# Patient Record
Sex: FEMALE | Race: WHITE | Employment: FULL TIME | URBAN - METROPOLITAN AREA
[De-identification: names, ages, dates, MRNs, and addresses within clinical notes are randomized per-mention and may not be internally consistent; named-entity substitution may affect disease eponyms.]

---

## 2017-02-23 ENCOUNTER — HOSPITAL ENCOUNTER (OUTPATIENT)
Dept: MAMMOGRAPHY | Age: 37
Discharge: HOME | End: 2017-02-23
Attending: OBSTETRICS & GYNECOLOGY
Payer: COMMERCIAL

## 2017-02-23 DIAGNOSIS — N63.0 BREAST LUMP: ICD-10-CM

## 2017-02-23 PROCEDURE — 77066 DX MAMMO INCL CAD BI: CPT

## 2017-02-23 PROCEDURE — 76642 ULTRASOUND BREAST LIMITED: CPT

## 2018-01-26 ENCOUNTER — OFFICE VISIT (OUTPATIENT)
Dept: OBGYN CLINIC | Age: 38
End: 2018-01-26

## 2018-01-26 VITALS
SYSTOLIC BLOOD PRESSURE: 120 MMHG | HEIGHT: 58 IN | WEIGHT: 130 LBS | BODY MASS INDEX: 27.29 KG/M2 | DIASTOLIC BLOOD PRESSURE: 74 MMHG

## 2018-01-26 DIAGNOSIS — Z01.419 WELL FEMALE EXAM WITH ROUTINE GYNECOLOGICAL EXAM: ICD-10-CM

## 2018-01-26 DIAGNOSIS — Z32.01 PREGNANCY EXAMINATION OR TEST, POSITIVE RESULT: Primary | ICD-10-CM

## 2018-01-26 LAB
ANTIBODY SCREEN, EXTERNAL: NEGATIVE
CHLAMYDIA, EXTERNAL: NEGATIVE
CYSTIC FIBROSIS, EXTERNAL: NORMAL
HBSAG, EXTERNAL: NEGATIVE
HCT, EXTERNAL: 36.4
HGB EVAL, EXTERNAL: NORMAL
HGB, EXTERNAL: 11.5
HIV, EXTERNAL: NEGATIVE
N. GONORRHEA, EXTERNAL: NEGATIVE
PLATELET CNT,   EXTERNAL: 348
RUBELLA, EXTERNAL: NORMAL
T. PALLIDUM, EXTERNAL: NEGATIVE
TYPE, ABO & RH, EXTERNAL: NORMAL
URINALYSIS, EXTERNAL: NEGATIVE

## 2018-01-26 RX ORDER — AMOXICILLIN 500 MG/1
500 CAPSULE ORAL
COMMUNITY
End: 2020-01-24 | Stop reason: ALTCHOICE

## 2018-01-26 NOTE — PATIENT INSTRUCTIONS
Learning About Pregnancy  Your Care Instructions    Your health in the early weeks of your pregnancy is particularly important for your baby's health. Take good care of yourself. Anything you do that harms your body can also harm your baby. Make sure to go to all of your doctor appointments. Regular checkups will help keep you and your baby healthy. How can you care for yourself at home? Diet  ? · Eat a balanced diet. Make sure your diet includes plenty of beans, peas, and leafy green vegetables. ? · Do not skip meals or go for many hours without eating. If you are nauseated, try to eat a small, healthy snack every 2 to 3 hours. ? · Do not eat fish that has a high level of mercury, such as shark, swordfish, or mackerel. Do not eat more than one can of tuna each week. ? · Drink plenty of fluids, enough so that your urine is light yellow or clear like water. If you have kidney, heart, or liver disease and have to limit fluids, talk with your doctor before you increase the amount of fluids you drink. ? · Cut down on caffeine, such as coffee, tea, and cola. ? · Do not drink alcohol, such as beer, wine, or hard liquor. ? · Take a multivitamin that contains at least 400 micrograms (mcg) of folic acid to help prevent birth defects. Fortified cereal and whole wheat bread are good additional sources of folic acid. ? · Increase the calcium in your diet. Try to drink a quart of skim milk each day. You may also take calcium supplements and choose foods such as cheese and yogurt. ? Lifestyle  ? · Make sure you go to your follow-up appointments. ? · Get plenty of rest. You may be unusually tired while you are pregnant. ? · Get at least 30 minutes of exercise on most days of the week. Walking is a good choice. If you have not exercised in the past, start out slowly. Take several short walks each day. ? · Do not smoke. If you need help quitting, talk to your doctor about stop-smoking programs.  These can increase your chances of quitting for good. ? · Do not touch cat feces or litter boxes. Also, wash your hands after you handle raw meat, and fully cook all meat before you eat it. Wear gloves when you work in the yard or garden, and wash your hands well when you are done. Cat feces, raw or undercooked meat, and contaminated dirt can cause an infection that may harm your baby or lead to a miscarriage. ? · Do not use saunas or hot tubs. Raising your body temperature may harm your baby. ? · Avoid chemical fumes, paint fumes, or poisons. ? · Do not use illegal drugs or alcohol. Medicines  ? · Review all of your medicines with your doctor. Some of your routine medicines may need to be changed to protect your baby. ? · Use acetaminophen (Tylenol) to relieve minor problems, such as a mild headache or backache or a mild fever with cold symptoms. Do not use nonsteroidal anti-inflammatory drugs (NSAIDs), such as ibuprofen (Advil, Motrin) or naproxen (Aleve), unless your doctor says it is okay. ? · Do not take two or more pain medicines at the same time unless the doctor told you to. Many pain medicines have acetaminophen, which is Tylenol. Too much acetaminophen (Tylenol) can be harmful. ? · Take your medicines exactly as prescribed. Call your doctor if you think you are having a problem with your medicine. ?To manage morning sickness  ? · If you feel sick when you first wake up, try eating a small snack (such as crackers) before you get out of bed. Allow some time to digest the snack, and then get out of bed slowly. ? · Do not skip meals or go for long periods without eating. An empty stomach can make nausea worse. ? · Eat small, frequent meals instead of three large meals each day. ? · Drink plenty of fluids. Sports drinks, such as Gatorade or Powerade, are good choices. ? · Eat foods that are high in protein but low in fat.    ? · If you are taking iron supplements, ask your doctor if they are necessary. Iron can make nausea worse. ? · Avoid any smells, such as coffee, that make you feel sick. ? · Get lots of rest. Morning sickness may be worse when you are tired. Follow-up care is a key part of your treatment and safety. Be sure to make and go to all appointments, and call your doctor if you are having problems. It's also a good idea to know your test results and keep a list of the medicines you take. Where can you learn more? Go to http://abby-delbert.info/. Enter O301 in the search box to learn more about \"Learning About Pregnancy. \"  Current as of: March 16, 2017  Content Version: 11.4  © 3672-8775 Healthwise, Incorporated. Care instructions adapted under license by [employer] (which disclaims liability or warranty for this information). If you have questions about a medical condition or this instruction, always ask your healthcare professional. [address] any warranty or liability for your use of this information.

## 2018-01-26 NOTE — MR AVS SNAPSHOT
[address] [facility] [address] [facility] 
[phone] Patient: Lilly Landis MRN: [id number] WME:[birth year] Visit Information Date & Time Provider Department Dept. Phone Encounter #  
 1/26/2018  1:00 PM Shahab Mason MD [facility] [phone] [id number] Upcoming Health Maintenance Date Due  
 PAP AKA CERVICAL CYTOLOGY 3/1/2015 Influenza Age 5 to Adult 8/1/2017 Allergies as of 1/26/2018  Review Complete On: 1/26/2018 By: Allyson Tucker No Known Allergies Current Immunizations  Never Reviewed No immunizations on file. Not reviewed this visit You Were Diagnosed With   
  
 Codes Comments Pregnancy examination or test, positive result    -  Primary ICD-10-CM: Z32.01 
ICD-9-CM: V72.42 Vitals BP Height(growth percentile) Weight(growth percentile) LMP BMI OB Status 120/74 4' 10\" (1.473 m) 130 lb (59 kg) (LMP Unknown) 27.17 kg/m2 Pregnant Smoking Status Never Smoker BMI and BSA Data Body Mass Index Body Surface Area  
 27.17 kg/m 2 1.55 m 2 Preferred Pharmacy Pharmacy Name Phone [facility] [address] [place] [address] [phone] Your Updated Medication List  
  
   
This list is accurate as of: 1/26/18  1:13 PM.  Always use your most recent med list.  
  
  
  
  
 amitriptyline 25 mg tablet Commonly known as:  ELAVIL Take 1 Tab by mouth nightly. amoxicillin 500 mg capsule Commonly known as:  AMOXIL Take 500 mg by mouth.  
  
 levothyroxine 75 mcg tablet Commonly known as:  SYNTHROID Take 1 tablet by mouth once daily before breakfast  
  
 SUMAtriptan 25 mg tablet Commonly known as:  IMITREX Take 1 Tab by mouth once as needed for Migraine for up to 1 dose. We Performed the Following ANTIBODY SCREEN C6364265 CPT(R)] BLOOD TYPE, (ABO+RH) [95424 CPT(R)] CBC W/O DIFF [69681 CPT(R)] CT/NG/T.VAGINALIS AMPLIFICATION [id number] CPT(R)] CULTURE, URINE [id number] CPT(R)] CYSTIC FIBROSIS MUTATION 97 [[id number] Custom] HEMOGLOBIN FRACTIONATION [[id number] Custom] HEP B SURFACE AG G7947193 CPT(R)] HIV 1/2 AG/AB, 4TH GENERATION,W RFLX CONFIRM C3141637 CPT(R)] PARVOVIRUS B19 AB, IGG [41369 CPT(R)] PARVOVIRUS B19 AB, IGM [74384 CPT(R)] PLATELET COUNT [66581 CPT(R)] RUBELLA AB, IGG A782154 CPT(R)] T PALLIDUM SCREEN W/REFLEX [[id number] Custom] Patient Instructions Learning About Pregnancy Your Care Instructions Your health in the early weeks of your pregnancy is particularly important for your baby's health. Take good care of yourself. Anything you do that harms your body can also harm your baby. Make sure to go to all of your doctor appointments. Regular checkups will help keep you and your baby healthy. How can you care for yourself at home? Diet ? · Eat a balanced diet. Make sure your diet includes plenty of beans, peas, and leafy green vegetables. ? · Do not skip meals or go for many hours without eating. If you are nauseated, try to eat a small, healthy snack every 2 to 3 hours. ? · Do not eat fish that has a high level of mercury, such as shark, swordfish, or mackerel. Do not eat more than one can of tuna each week. ? · Drink plenty of fluids, enough so that your urine is light yellow or clear like water. If you have kidney, heart, or liver disease and have to limit fluids, talk with your doctor before you increase the amount of fluids you drink. ? · Cut down on caffeine, such as coffee, tea, and cola. ? · Do not drink alcohol, such as beer, wine, or hard liquor. ? · Take a multivitamin that contains at least 400 micrograms (mcg) of folic acid to help prevent birth defects. Fortified cereal and whole wheat bread are good additional sources of folic acid. ? · Increase the calcium in your diet. Try to drink a quart of skim milk each day. You may also take calcium supplements and choose foods such as cheese and yogurt. ? Lifestyle ? · Make sure you go to your follow-up appointments. ? · Get plenty of rest. You may be unusually tired while you are pregnant. ? · Get at least 30 minutes of exercise on most days of the week. Walking is a good choice. If you have not exercised in the past, start out slowly. Take several short walks each day. ? · Do not smoke. If you need help quitting, talk to your doctor about stop-smoking programs. These can increase your chances of quitting for good. ? · Do not touch cat feces or litter boxes. Also, wash your hands after you handle raw meat, and fully cook all meat before you eat it. Wear gloves when you work in the yard or garden, and wash your hands well when you are done. Cat feces, raw or undercooked meat, and contaminated dirt can cause an infection that may harm your baby or lead to a miscarriage. ? · Do not use saunas or hot tubs. Raising your body temperature may harm your baby. ? · Avoid chemical fumes, paint fumes, or poisons. ? · Do not use illegal drugs or alcohol. Medicines ? · Review all of your medicines with your doctor. Some of your routine medicines may need to be changed to protect your baby. ? · Use acetaminophen (Tylenol) to relieve minor problems, such as a mild headache or backache or a mild fever with cold symptoms. Do not use nonsteroidal anti-inflammatory drugs (NSAIDs), such as ibuprofen (Advil, Motrin) or naproxen (Aleve), unless your doctor says it is okay. ? · Do not take two or more pain medicines at the same time unless the doctor told you to. Many pain medicines have acetaminophen, which is Tylenol. Too much acetaminophen (Tylenol) can be harmful. ? · Take your medicines exactly as prescribed. Call your doctor if you think you are having a problem with your medicine. ?To manage morning sickness ? · If you feel sick when you first wake up, try eating a small snack (such as crackers) before you get out of bed. Allow some time to digest the snack, and then get out of bed slowly. ? · Do not skip meals or go for long periods without eating. An empty stomach can make nausea worse. ? · Eat small, frequent meals instead of three large meals each day. ? · Drink plenty of fluids. Sports drinks, such as Gatorade or Powerade, are good choices. ? · Eat foods that are high in protein but low in fat. ? · If you are taking iron supplements, ask your doctor if they are necessary. Iron can make nausea worse. ? · Avoid any smells, such as coffee, that make you feel sick. ? · Get lots of rest. Morning sickness may be worse when you are tired. Follow-up care is a key part of your treatment and safety. Be sure to make and go to all appointments, and call your doctor if you are having problems. It's also a good idea to know your test results and keep a list of the medicines you take. Where can you learn more? Go to http://abby-delbert.info/. Enter P303 in the search box to learn more about \"Learning About Pregnancy. \" Current as of: March 16, 2017 Content Version: 11.4 © 1997-8232 Healthwise, Incorporated. Care instructions adapted under license by [employer] (which disclaims liability or warranty for this information). If you have questions about a medical condition or this instruction, always ask your healthcare professional. [place] [address] any warranty or liability for your use of this information. Introducing [facility]! [facility] introduces [employer] patient portal. Now you can access parts of your medical record, email your doctor's office, and request medication refills online. 1. In your internet browser, go to [email or web address] Had [email or web address] Had Cancer 2. Click on the First Time User? Click Here link in the Sign In box. You will see the New Member Sign Up page. 3. Enter your [employer] Access Code exactly as it appears below. You will not need to use this code after youve completed the sign-up process. If you do not sign up before the expiration date, you must request a new code. · [employer] Access Code: [email or web address] Expires: 4/26/2018  1:12 PM 
 
4. Enter the last four digits of your Social Security Number (xxxx) and Date of Birth (mm/dd/yyyy) as indicated and click Submit. You will be taken to the next sign-up page. 5. Create a [employer] ID. This will be your [employer] login ID and cannot be changed, so think of one that is secure and easy to remember. 6. Create a [employer] password. You can change your password at any time. 7. Enter your Password Reset Question and Answer. This can be used at a later time if you forget your password. 8. Enter your e-mail address. You will receive e-mail notification when new information is available in [address] 9. Click Sign Up. You can now view and download portions of your medical record. 10. Click the Download Summary menu link to download a portable copy of your medical information. If you have questions, please visit the Frequently Asked Questions section of the [employer] website. Remember, [employer] is NOT to be used for urgent needs. For medical emergencies, dial 911. Now available from your iPhone and Android! Please provide this summary of care documentation to your next provider. Your primary care clinician is listed as Sonia Eduardo. If you have any questions after today's visit, please call [phone].

## 2018-01-26 NOTE — PROGRESS NOTES
Current pregnancy history:    Ariana Humphreys is a ,  40 y.o. female [facility] No LMP recorded (lmp unknown). Patient is pregnant. .  She presents for the evaluation of amenorrhea and a positive pregnancy test.    LMP history:  The date of her LMP is not certain. Her last menstrual period was normal and lasted for 4 to 5 days. A urine pregnancy test was positive several weeks ago. She was not on the pill at conception. Ultrasound data:  She had an  ultrasound done by the ultrasound tech today which revealed a viable martinez pregnancy with a gestational age of 9 weeks and 0 days giving an EDC of 18. Pregnancy symptoms:    Since her LMP she has experienced  urinary frequency, breast tenderness, and nausea. She has not been vomiting over the last few weeks. Associated signs and symptoms which she denies: dysuria, discharge, vaginal bleeding. She currently is being treated for strep throat. Relevant past pregnancy history:   She has the following pregnancy history: Her last pregnancy was uncomplicated. She has no history of  delivery. Relevant past medical history:(relevant to this pregnancy): noncontributory. Pap/Occupational history:  Last pap smear: last year Results: Normal - pap today    Substance history: negative for alcohol, tobacco and street drugs. Positive for nothing. Exposure history: There is/are no indoor cat/s in the home. She admits close contact with children on a regular basis. She has had chicken pox or the vaccine in the past.   Patient denies issues with domestic violence. Genetic Screening/Teratology Counseling: (Includes patient, baby's father, or anyone in either family with:)  3.  Patient's age >/= 28 at [facility]?-- no  .   2.   [facility]semia ([facility] [place], [address], or  background): MCV<80?--no.     3.  Neural tube defect (meningomyelocele, spina bifida, anencephaly)?--no.   4.  Congenital heart defect?--no.  5.  Down syndrome?--no.   6.  Dwayen-Sachs (Yaz[place], [place])?--no.   7.  Canavan's Disease?--no.   8.  Familial Dysautonomia?--no.   9.  Sickle cell disease or trait ()? --no   The patient has not been tested for sickle trait  10. Hemophilia or other blood disorders?--no. 11.  Muscular dystrophy?--no. 12.  Cystic fibrosis?--no. 13.  Merced's Chorea?--no. 14.  Mental retardation/autism (if yes was person tested for Fragile X)?--no. 15.  Other inherited genetic or chromosomal disorder?--no. 12.  Maternal metabolic disorder (DM, PKU, etc)?--no. 17.  Patient or FOB with a child with a birth defect not listed above?--no.  17a. Patient or FOB with a birth defect themselves?--no. 18.  Recurrent pregnancy loss, or stillbirth?--no. 19.  Any medications since LMP other than prenatal vitamins (include vitamins, supplements, OTC meds, drugs, alcohol)?--no. 20.  Any other genetic/environmental exposure to discuss?--no. Infection History:  1. Lives with someone with TB or TB exposed?--no.   2.  Patient or partner has history of genital herpes?--no.  3.  Rash or viral illness since LMP?--no.    4.  History of STD (GC, CT, HPV, syphilis, HIV)? --no   5. Other: OTHER? Past Medical History:   Diagnosis Date    Graves disease     History of radiation treatment resulting in hypothyroidism    Hashimoto's thyroiditis     Thyroid disease      Past Surgical History:   Procedure Laterality Date    HX OTHER SURGICAL      radiation to thyroid gland, no longer has thyroid tissue present     Social History     Occupational History    Not on file.      Social History Main Topics    Smoking status: Never Smoker    Smokeless tobacco: Never Used    Alcohol use No    Drug use: No    Sexual activity: Yes     Partners: Male     Birth control/ protection: None     Family History   Problem Relation Age of Onset    Diabetes Mother     Hypertension Mother     Psoriasis Mother     Thyroid Disease Mother     Cancer Father      OB History    Para Term  AB Living   1        SAB TAB Ectopic Molar Multiple Live Births              # Outcome Date GA Lbr Brad/2nd Weight Sex Delivery Anes PTL Lv   1 Current                 No Known Allergies  Prior to Admission medications    Medication Sig Start Date End Date Taking? Authorizing Provider   amoxicillin (AMOXIL) 500 mg capsule Take 500 mg by mouth. Yes Historical Provider   levothyroxine (SYNTHROID) 75 mcg tablet Take 1 tablet by mouth once daily before breakfast 10/12/14  Yes Gary Eduardo MD   amitriptyline (ELAVIL) 25 mg tablet Take 1 Tab by mouth nightly. 3/1/16   Kendall Longo NP   SUMAtriptan (IMITREX) 25 mg tablet Take 1 Tab by mouth once as needed for Migraine for up to 1 dose.  3/1/16   Kendall Longo NP        Review of Systems: History obtained from the patient  Constitutional: negative for weight loss, fever, night sweats  HEENT: negative for hearing loss, earache, congestion, snoring, sore throat  CV: negative for chest pain, palpitations, edema  Resp: negative for cough, shortness of breath, wheezing  Breast: negative for breast lumps, nipple discharge, galactorrhea  GI: negative for change in bowel habits, abdominal pain, black or bloody stools  : negative for frequency, dysuria, hematuria, vaginal discharge  MSK: negative for back pain, joint pain, muscle pain  Skin: negative for itching, rash, hives  Neuro: negative for dizziness, headache, confusion, weakness  Psych: negative for anxiety, depression, change in mood  Heme/lymph: negative for bleeding, bruising, pallor    Objective:  Visit Vitals    /74    Ht 4' 10\" (1.473 m)    Wt 130 lb (59 kg)    LMP  (LMP Unknown)    BMI 27.17 kg/m2       Physical Exam:     Constitutional  · Appearance: well-nourished, well developed, alert, in no acute distress    HENT  · Head  · Face: appears normal  · Eyes: appear normal  · Ears: normal  · Mouth: normal  · Lips: no lesions    Neck  · Inspection/Palpation: normal appearance, no masses or tenderness  · Lymph Nodes: no lymphadenopathy present  · Thyroid: gland size normal, nontender, no nodules or masses present on palpation    Chest  · Respiratory Effort: breathing unlabored    Gastrointestinal  · Abdominal Examination: abdomen non-tender to palpation, normal bowel sounds, no masses present  · Liver and spleen: no hepatomegaly present, spleen not palpable  · Hernias: no hernias identified    Genitourinary  · External Genitalia: normal appearance for age, no discharge present, no tenderness present, no inflammatory lesions present, no masses present, no atrophy present  · Vagina: normal vaginal vault without central or paravaginal defects, no discharge present, no inflammatory lesions present, no masses present  · Bladder: non-tender to palpation  · Urethra: appears normal  · Cervix: normal   · Uterus: enlarged, normal shape, soft  · Adnexa: no adnexal tenderness present, no adnexal masses present  · Perineum: perineum within normal limits, no evidence of trauma, no rashes or skin lesions present  · Anus: anus within normal limits, no hemorrhoids present  · Inguinal Lymph Nodes: no lymphadenopathy present    Skin  · General Inspection: no rash, no lesions identified    Neurologic/Psychiatric  · Mental Status:  · Orientation: grossly oriented to person, place and time  · Mood and Affect: mood normal, affect appropriate    Assessment:   Intrauterine pregnancy with the following problems identified: [facility] referral.  Interested in [facility]. Will RTO next week for bloodwork. Plan:     Offered CF testing, CVS, Nuchal Translucency, MSAFP, amnio, and discussed NIPT  Course of pregnancy discussed including visit schedule, routine U/S, glucola testing, etc.  Avoid alcoholic beverages and illicit/recreational drugs use  Take prenatal vitamins or folic acid daily.   Hospital and practice style discussed with coverage system. Discussed nutrition, toxoplasmosis precautions, sexual activity, exercise, need for influenza vaccine, environmental and work hazards, travel advice, screen for domestic violence, need for seat belts. Discussed seafood, unpasteurized dairy products, deli meat, artificial sweeteners, and caffeine. Information on prenatal classes/breastfeeding given. Information on circumcision given  Patient encouraged not to smoke. Discussed current prescription drug use. Given medication list.  Discussed the use of over the counter medications and chemicals. Route of delivery discussed, including risks, benefits, and alternatives of  versus repeat LTCS. Pt understands risk of hemorrhage during pregnancy and post delivery and would accept blood products if necessary in life-threatening emergencies    Handouts given to pt.

## 2018-01-30 LAB
C TRACH RRNA SPEC QL NAA+PROBE: NEGATIVE
N GONORRHOEA RRNA SPEC QL NAA+PROBE: NEGATIVE
T VAGINALIS RRNA SPEC QL NAA+PROBE: NEGATIVE

## 2018-01-31 LAB — BACTERIA UR CULT: NO GROWTH

## 2018-02-01 LAB
CYTOLOGIST CVX/VAG CYTO: NORMAL
CYTOLOGY CVX/VAG DOC THIN PREP: NORMAL
CYTOLOGY HISTORY:: NORMAL
DX ICD CODE: NORMAL
HPV I/H RISK 1 DNA CVX QL PROBE+SIG AMP: NEGATIVE
Lab: NORMAL
OTHER STN SPEC: NORMAL
PATH REPORT.FINAL DX SPEC: NORMAL
STAT OF ADQ CVX/VAG CYTO-IMP: NORMAL

## 2018-02-06 LAB
ABO GROUP BLD: NORMAL
B19V IGG SER IA-ACNC: 0.2 INDEX (ref 0–0.8)
B19V IGM SER IA-ACNC: 0.3 INDEX (ref 0–0.8)
BLD GP AB SCN SERPL QL: NEGATIVE
CFTR MUT ANL BLD/T: NORMAL
ERYTHROCYTE [DISTWIDTH] IN BLOOD BY AUTOMATED COUNT: 15.8 % (ref 12.3–15.4)
GENE DIS ANL CARRIER INTERP-IMP: NORMAL
HBV SURFACE AG SERPL QL IA: NEGATIVE
HCT VFR BLD AUTO: 36.4 % (ref 34–46.6)
HGB A MFR BLD: 97.7 % (ref 96.4–98.8)
HGB A2 MFR BLD COLUMN CHROM: 2.3 % (ref 1.8–3.2)
HGB BLD-MCNC: 11.5 G/DL (ref 11.1–15.9)
HGB C MFR BLD: 0 %
HGB F MFR BLD: 0 % (ref 0–2)
HGB FRACT BLD-IMP: NORMAL
HGB OTHER MFR BLD HPLC: 0 %
HGB S BLD QL SOLY: NEGATIVE
HGB S MFR BLD: 0 %
HIV 1+2 AB+HIV1 P24 AG SERPL QL IA: NON REACTIVE
MCH RBC QN AUTO: 24.4 PG (ref 26.6–33)
MCHC RBC AUTO-ENTMCNC: 31.6 G/DL (ref 31.5–35.7)
MCV RBC AUTO: 77 FL (ref 79–97)
PLATELET # BLD AUTO: 348 X10E3/UL (ref 150–379)
RBC # BLD AUTO: 4.72 X10E6/UL (ref 3.77–5.28)
RH BLD: POSITIVE
RUBV IGG SERPL IA-ACNC: 2.62 INDEX
T PALLIDUM AB SER QL IA: NEGATIVE
WBC # BLD AUTO: 4.8 X10E3/UL (ref 3.4–10.8)

## 2018-02-14 ENCOUNTER — HOSPITAL ENCOUNTER (OUTPATIENT)
Dept: PERINATAL CARE | Age: 38
Discharge: HOME | End: 2018-02-14
Attending: OBSTETRICS & GYNECOLOGY
Payer: COMMERCIAL

## 2018-02-14 PROCEDURE — 76801 OB US < 14 WKS SINGLE FETUS: CPT | Performed by: OBSTETRICS & GYNECOLOGY

## 2018-02-26 ENCOUNTER — ROUTINE PRENATAL (OUTPATIENT)
Dept: OBGYN CLINIC | Age: 38
End: 2018-02-26

## 2018-02-26 VITALS — DIASTOLIC BLOOD PRESSURE: 74 MMHG | SYSTOLIC BLOOD PRESSURE: 116 MMHG | WEIGHT: 130 LBS | BODY MASS INDEX: 27.17 KG/M2

## 2018-02-26 DIAGNOSIS — O09.522 ELDERLY MULTIGRAVIDA IN SECOND TRIMESTER: ICD-10-CM

## 2018-02-26 NOTE — MR AVS SNAPSHOT
900 [facility] [address] [place] [address] 
[phone] Patient: Ameena Guzman MRN: [id number] ANGEL:[birth year] Visit Information Date & Time Provider Department Dept. Phone Encounter #  
 2/26/2018  1:30 PM Francia Cardona MD M[facility] Teague [phone] [id number] Upcoming Health Maintenance Date Due Influenza Age 5 to Adult 8/1/2017 PAP AKA CERVICAL CYTOLOGY 1/26/2021 Allergies as of 2/26/2018  Review Complete On: 2/26/2018 By: Leena Frausto No Known Allergies Current Immunizations  Never Reviewed No immunizations on file. Not reviewed this visit Vitals BP Weight(growth percentile) LMP BMI OB Status Smoking Status 116/74 130 lb (59 kg) (LMP Unknown) 27.17 kg/m2 Pregnant Never Smoker BMI and BSA Data Body Mass Index Body Surface Area  
 27.17 kg/m 2 1.55 m 2 Preferred Pharmacy Pharmacy Name Phone [facility] [address] [place] [address] Lorna Donahue [phone] Your Updated Medication List  
  
   
This list is accurate as of 2/26/18  1:54 PM.  Always use your most recent med list.  
  
  
  
  
 amitriptyline 25 mg tablet Commonly known as:  ELAVIL Take 1 Tab by mouth nightly. amoxicillin 500 mg capsule Commonly known as:  AMOXIL Take 500 mg by mouth.  
  
 levothyroxine 75 mcg tablet Commonly known as:  SYNTHROID Take 1 tablet by mouth once daily before breakfast  
  
 SUMAtriptan 25 mg tablet Commonly known as:  IMITREX Take 1 Tab by mouth once as needed for Migraine for up to 1 dose. Introducing [facility] & HEALTH SERVICES! Dear Cruz Pierce: Thank you for requesting a [employer] account. Our records indicate that you already have an active O[employer] account. You can access your account anytime at [email or web address] Did you know that you can access your hospital and ER discharge instructions at any time in [employer]? You can also review all of your test results from your hospital stay or ER visit. Additional Information If you have questions, please visit the Frequently Asked Questions section of the [employer] website at [email or web address] Remember, [employer] is NOT to be used for urgent needs. For medical emergencies, dial 911. Now available from your iPhone and Android! Please provide this summary of care documentation to your next provider. Your primary care clinician is listed as Sonia Eduardo. If you have any questions after today's visit, please call [phone].

## 2018-03-26 ENCOUNTER — ROUTINE PRENATAL (OUTPATIENT)
Dept: OBGYN CLINIC | Age: 38
End: 2018-03-26

## 2018-03-26 VITALS — DIASTOLIC BLOOD PRESSURE: 60 MMHG | WEIGHT: 134 LBS | BODY MASS INDEX: 28.01 KG/M2 | SYSTOLIC BLOOD PRESSURE: 112 MMHG

## 2018-03-26 DIAGNOSIS — Z34.82 PRENATAL CARE, SUBSEQUENT PREGNANCY IN SECOND TRIMESTER: Primary | ICD-10-CM

## 2018-03-26 LAB — AFP, MATERNAL, EXTERNAL: NORMAL

## 2018-03-26 NOTE — MR AVS SNAPSHOT
900 [facility] [address] [facility] 
[phone] Patient: Kristy Sharpe MRN: [id number] RJQ:[birth year] Visit Information Date & Time Provider Department Dept. Phone Encounter #  
 3/26/2018  2:00 PM Argelia SaucedaPhilly [phone] [id number] Upcoming Health Maintenance Date Due Influenza Age 5 to Adult 8/1/2017 PAP AKA CERVICAL CYTOLOGY 1/26/2021 Allergies as of 3/26/2018  Review Complete On: 3/26/2018 By: Argelia Sauceda MD  
 No Known Allergies Current Immunizations  Never Reviewed No immunizations on file. Not reviewed this visit You Were Diagnosed With   
  
 Codes Comments Prenatal care, subsequent pregnancy in second trimester    -  Primary ICD-10-CM: Z34.82 
ICD-9-CM: V22.1 Vitals BP Weight(growth percentile) LMP BMI OB Status Smoking Status 112/60 134 lb (60.8 kg) (LMP Unknown) 28.01 kg/m2 Pregnant Never Smoker BMI and BSA Data Body Mass Index Body Surface Area 28.01 kg/m 2 1.58 m 2 Preferred Pharmacy Pharmacy Name Phone [facility] [address] [place] [address] [phone] Your Updated Medication List  
  
   
This list is accurate as of 3/26/18  2:01 PM.  Always use your most recent med list.  
  
  
  
  
 amitriptyline 25 mg tablet Commonly known as:  ELAVIL Take 1 Tab by mouth nightly. amoxicillin 500 mg capsule Commonly known as:  AMOXIL Take 500 mg by mouth.  
  
 levothyroxine 75 mcg tablet Commonly known as:  SYNTHROID Take 1 tablet by mouth once daily before breakfast  
  
 SUMAtriptan 25 mg tablet Commonly known as:  IMITREX Take 1 Tab by mouth once as needed for Migraine for up to 1 dose. We Performed the Following   
 AFP, MATERNAL SCREEN [23058 CPT(R)] Introducing [facility] & HEALTH SERVICES! Dear Serena Severe: Thank you for requesting a [employer] account. Our records indicate that you already have an active [employer] account. You can access your account anytime at [email or web address] Did you know that you can access your hospital and ER discharge instructions at any time in [employer]? You can also review all of your test results from your hospital stay or ER visit. Additional Information If you have questions, please visit the Frequently Asked Questions section of the [employer] website at [email or web address] Remember, [employer] is NOT to be used for urgent needs. For medical emergencies, dial 911. Now available from your iPhone and Android! Please provide this summary of care documentation to your next provider. Your primary care clinician is listed as Sonia Eduardo. If you have any questions after today's visit, please call [phone].

## 2018-03-28 LAB
AFP ADJ MOM SERPL: 2.08
AFP INTERP SERPL-IMP: NORMAL
AFP INTERP SERPL-IMP: NORMAL
AFP SERPL-MCNC: 95.9 NG/ML
AGE AT DELIVERY: 38.4 YEARS
COMMENT, 018013: NORMAL
GA METHOD: NORMAL
GA: 18 WEEKS
IDDM PATIENT QL: NO
MULTIPLE PREGNANCY: NO
NEURAL TUBE DEFECT RISK FETUS: 676 %
RESULTS, 017004: NORMAL

## 2018-04-09 ENCOUNTER — HOSPITAL ENCOUNTER (OUTPATIENT)
Dept: PERINATAL CARE | Age: 38
Discharge: HOME | End: 2018-04-09
Attending: OBSTETRICS & GYNECOLOGY
Payer: COMMERCIAL

## 2018-04-09 PROCEDURE — 76811 OB US DETAILED SNGL FETUS: CPT | Performed by: OBSTETRICS & GYNECOLOGY

## 2018-04-23 ENCOUNTER — ROUTINE PRENATAL (OUTPATIENT)
Dept: OBGYN CLINIC | Age: 38
End: 2018-04-23

## 2018-04-23 VITALS — BODY MASS INDEX: 28.63 KG/M2 | DIASTOLIC BLOOD PRESSURE: 78 MMHG | SYSTOLIC BLOOD PRESSURE: 118 MMHG | WEIGHT: 137 LBS

## 2018-04-23 DIAGNOSIS — O09.522 ELDERLY MULTIGRAVIDA IN SECOND TRIMESTER: Primary | ICD-10-CM

## 2018-04-23 NOTE — MR AVS SNAPSHOT
[address] [place] Carlotta Cortes  [address] 
[phone] Patient: Neel Zimmerman MRN: [id number] EYS:[birth year] Visit Information Date & Time Provider Department Dept. Phone Encounter #  
 4/23/2018  2:20 PM Kenney Monteiro MD [facility] [phone] [id number] Upcoming Health Maintenance Date Due Influenza Age 5 to Adult 8/1/2017 PAP AKA CERVICAL CYTOLOGY 1/26/2021 Allergies as of 4/23/2018  Review Complete On: 4/23/2018 By: Юлия Munguia No Known Allergies Current Immunizations  Never Reviewed No immunizations on file. Not reviewed this visit Vitals BP Weight(growth percentile) LMP BMI OB Status Smoking Status 118/78 137 lb (62.1 kg) (LMP Unknown) 28.63 kg/m2 Pregnant Never Smoker BMI and BSA Data Body Mass Index Body Surface Area  
 28.63 kg/m 2 1.59 m 2 Preferred Pharmacy Pharmacy Name Phone [facility] [address] [place] [address] [phone] Your Updated Medication List  
  
   
This list is accurate as of 4/23/18  2:29 PM.  Always use your most recent med list.  
  
  
  
  
 amitriptyline 25 mg tablet Commonly known as:  ELAVIL Take 1 Tab by mouth nightly. amoxicillin 500 mg capsule Commonly known as:  AMOXIL Take 500 mg by mouth.  
  
 levothyroxine 75 mcg tablet Commonly known as:  SYNTHROID Take 1 tablet by mouth once daily before breakfast  
  
 SUMAtriptan 25 mg tablet Commonly known as:  IMITREX Take 1 Tab by mouth once as needed for Migraine for up to 1 dose. Introducing [facility] & HEALTH SERVICES! Dear Preston Wilder: Thank you for requesting a [employer] account. Our records indicate that you already have an active [employer] account. You can access your account anytime at [email or web address] Did you know that you can access your hospital and ER discharge instructions at any time in [employer]? You can also review all of your test results from your hospital stay or ER visit. Additional Information If you have questions, please visit the Frequently Asked Questions section of the [employer] website at [email or web address] Remember, [employer] is NOT to be used for urgent needs. For medical emergencies, dial 911. Now available from your iPhone and Android! Please provide this summary of care documentation to your next provider. Your primary care clinician is listed as Sonia Eduardo. If you have any questions after today's visit, please call [phone].

## 2018-05-23 ENCOUNTER — ROUTINE PRENATAL (OUTPATIENT)
Dept: OBGYN CLINIC | Age: 38
End: 2018-05-23

## 2018-05-23 VITALS — WEIGHT: 135 LBS | BODY MASS INDEX: 28.22 KG/M2 | DIASTOLIC BLOOD PRESSURE: 70 MMHG | SYSTOLIC BLOOD PRESSURE: 102 MMHG

## 2018-05-23 DIAGNOSIS — Z34.82 PRENATAL CARE, SUBSEQUENT PREGNANCY IN SECOND TRIMESTER: Primary | ICD-10-CM

## 2018-05-23 LAB — GTT, 1 HR, GLUCOLA, EXTERNAL: NORMAL

## 2018-05-23 NOTE — LETTER
5/23/2018 10:20 AM 
 
Ms. Eddie Arias 
[address] [address] [phone] Due to discomforts of pregnancy she may suffer from restless leg syndrome and may need to use the bathroom every 2 hours. Sincerely, Eliud Longoria MD

## 2018-05-24 LAB
ERYTHROCYTE [DISTWIDTH] IN BLOOD BY AUTOMATED COUNT: 14.7 % (ref 12.3–15.4)
GLUCOSE 1H P 50 G GLC PO SERPL-MCNC: 146 MG/DL (ref 65–129)
HCT VFR BLD AUTO: 34.9 % (ref 34–46.6)
HGB BLD-MCNC: 10.7 G/DL (ref 11.1–15.9)
MCH RBC QN AUTO: 24.8 PG (ref 26.6–33)
MCHC RBC AUTO-ENTMCNC: 30.7 G/DL (ref 31.5–35.7)
MCV RBC AUTO: 81 FL (ref 79–97)
PLATELET # BLD AUTO: 337 X10E3/UL (ref 150–379)
RBC # BLD AUTO: 4.31 X10E6/UL (ref 3.77–5.28)
WBC # BLD AUTO: 8.2 X10E3/UL (ref 3.4–10.8)

## 2018-05-24 NOTE — PROGRESS NOTES
Patient aware of results and MD recommendations by phone. Patient aware nothing to eat after midnight, but all of the plain water to drink. She is aware that the gtt testing starts at 8:30.   Patient scheduled 3hr gtt for 5/31/2018

## 2018-05-31 ENCOUNTER — LAB ONLY (OUTPATIENT)
Dept: OBGYN CLINIC | Age: 38
End: 2018-05-31

## 2018-05-31 DIAGNOSIS — Z34.80 PRENATAL CARE OF MULTIGRAVIDA, ANTEPARTUM: Primary | ICD-10-CM

## 2018-05-31 DIAGNOSIS — R89.9 ABNORMAL LABORATORY TEST RESULT: ICD-10-CM

## 2018-05-31 LAB
GTT 120 MIN, EXTERNAL: 167
GTT 180 MIN, EXTERNAL: 115
GTT 60 MIN, EXTERNAL: 188
GTT, FASTING, EXTERNAL: 77

## 2018-06-01 LAB
GLUCOSE 1H P 100 G GLC PO SERPL-MCNC: 188 MG/DL (ref 65–179)
GLUCOSE 2H P 100 G GLC PO SERPL-MCNC: 167 MG/DL (ref 65–154)
GLUCOSE 3H P 100 G GLC PO SERPL-MCNC: 115 MG/DL (ref 65–139)
GLUCOSE P FAST SERPL-MCNC: 77 MG/DL (ref 65–94)
NOTE:, 102047: ABNORMAL

## 2018-06-04 DIAGNOSIS — O09.522 ELDERLY MULTIGRAVIDA IN SECOND TRIMESTER: ICD-10-CM

## 2018-06-04 NOTE — PROGRESS NOTES
Pt notified-- has an appt with Dr. Jason Juares on 6/25/18-- faxed over [facility] and [address] [place] [address] appt request

## 2018-06-06 ENCOUNTER — HOSPITAL ENCOUNTER (OUTPATIENT)
Dept: DIABETES SERVICES | Age: 38
Discharge: HOME | End: 2018-06-06
Payer: COMMERCIAL

## 2018-06-06 ENCOUNTER — ROUTINE PRENATAL (OUTPATIENT)
Dept: OBGYN CLINIC | Age: 38
End: 2018-06-06

## 2018-06-06 VITALS
HEIGHT: 59 IN | DIASTOLIC BLOOD PRESSURE: 64 MMHG | BODY MASS INDEX: 27.62 KG/M2 | WEIGHT: 137 LBS | SYSTOLIC BLOOD PRESSURE: 100 MMHG

## 2018-06-06 DIAGNOSIS — O24.419 GESTATIONAL DIABETES MELLITUS (GDM), ANTEPARTUM, GESTATIONAL DIABETES METHOD OF CONTROL UNSPECIFIED: ICD-10-CM

## 2018-06-06 DIAGNOSIS — O09.522 ELDERLY MULTIGRAVIDA IN SECOND TRIMESTER: ICD-10-CM

## 2018-06-06 DIAGNOSIS — Z23 ENCOUNTER FOR IMMUNIZATION: Primary | ICD-10-CM

## 2018-06-06 PROCEDURE — G0108 DIAB MANAGE TRN  PER INDIV: HCPCS | Performed by: DIETITIAN, REGISTERED

## 2018-06-06 RX ORDER — BLOOD-GLUCOSE CONTROL, NORMAL
EACH MISCELLANEOUS
Qty: 120 LANCET | Refills: 11 | Status: SHIPPED | OUTPATIENT
Start: 2018-06-06 | End: 2020-01-24 | Stop reason: ALTCHOICE

## 2018-06-06 RX ORDER — LEVOTHYROXINE SODIUM 88 UG/1
TABLET ORAL
COMMUNITY
Start: 2018-05-30

## 2018-06-06 NOTE — PROGRESS NOTES
45year old  28w5d pregnant patient given the 0.5ml t-dap injection as per MD order. Patient signed consent. Patient tolerated the injection in left deltoid with out complications.

## 2018-06-06 NOTE — PROGRESS NOTES
Pt doing well, see prenatal flowsheet. tdap today  Saw [facility] today and [facility] and Endocrine appointments in 2 weeks.

## 2018-06-07 ENCOUNTER — TELEPHONE (OUTPATIENT)
Dept: OBGYN CLINIC | Age: 38
End: 2018-06-07

## 2018-06-07 NOTE — TELEPHONE ENCOUNTER
[facility] calling to get information on how often she will need to test her blood sugar for her glucose strips for insurance purposes. Up to 4 x daily.     Pharmacy advised

## 2018-06-15 ENCOUNTER — HOSPITAL ENCOUNTER (OUTPATIENT)
Dept: DIABETES SERVICES | Age: 38
Discharge: HOME | End: 2018-06-15
Payer: COMMERCIAL

## 2018-06-15 DIAGNOSIS — O24.419 GESTATIONAL DIABETES MELLITUS (GDM), ANTEPARTUM, GESTATIONAL DIABETES METHOD OF CONTROL UNSPECIFIED: ICD-10-CM

## 2018-06-15 PROCEDURE — G0108 DIAB MANAGE TRN  PER INDIV: HCPCS | Performed by: DIETITIAN, REGISTERED

## 2018-06-15 NOTE — DIABETES MGMT
06/15/18    Thank you for your kind referral. Your patient, Marcelo De La Cruz attended a follow up gestational diabetes education session at [address] [place] [address] where the following topics were covered today:    *Incorporating nutrition management into lifestyle   *Monitoring blood glucose and other parameters and interpreting and using the results  for self-management decision making   * Incorporating physical activity into lifestyle   * Using medication(s) safely and for maximum therapeutic benefit   * Develop personal strategies to promote health and behavior change  * States pros and cons of breastfeeding  * Develop personal strategies to prevent Type 2 Diabetes in the future      Data from this visit:   Weight: 6/6/2018 136.4 #, 6/15/2018 138.2 #    Pt continued her goals set at first session: Goal 1: check my blood sugar 4 times per day  Goal 2: eat meals and snacks about the same time each day and discontinue all sweet beverages including fruit juice    Comments: Pt has had issues getting test strips so not much data to review today. All but one BS were WNL. Your patient has been encouraged to follow up with the [place] [address] staff by phone or in the office 6-12 weeks post partum so we can assess weight loss, meal planning skills and activity levels post partum to help prevent Type 2 Diabetes in the future. We look forward to assisting your patient in meeting their self-management goals.  If you have any questions, please do not hesitate to call the [facility] at [phone]    Sincerely, Tess Friedman, [address]  Phone: [phone] Fax: [phone]

## 2018-06-20 ENCOUNTER — ROUTINE PRENATAL (OUTPATIENT)
Dept: OBGYN CLINIC | Age: 38
End: 2018-06-20

## 2018-06-20 ENCOUNTER — HOSPITAL ENCOUNTER (OUTPATIENT)
Dept: PERINATAL CARE | Age: 38
Discharge: HOME | End: 2018-06-20
Attending: OBSTETRICS & GYNECOLOGY
Payer: COMMERCIAL

## 2018-06-20 VITALS — BODY MASS INDEX: 28.56 KG/M2 | DIASTOLIC BLOOD PRESSURE: 76 MMHG | SYSTOLIC BLOOD PRESSURE: 110 MMHG | WEIGHT: 139 LBS

## 2018-06-20 DIAGNOSIS — O09.522 ELDERLY MULTIGRAVIDA IN SECOND TRIMESTER: Primary | ICD-10-CM

## 2018-06-20 PROCEDURE — 76815 OB US LIMITED FETUS(S): CPT | Performed by: OBSTETRICS & GYNECOLOGY

## 2018-06-20 PROCEDURE — 76820 UMBILICAL ARTERY ECHO: CPT | Performed by: OBSTETRICS & GYNECOLOGY

## 2018-07-05 ENCOUNTER — HOSPITAL ENCOUNTER (OUTPATIENT)
Age: 38
Setting detail: OBSERVATION
Discharge: HOME | End: 2018-07-06
Attending: OBSTETRICS & GYNECOLOGY | Admitting: OBSTETRICS & GYNECOLOGY
Payer: COMMERCIAL

## 2018-07-05 ENCOUNTER — ROUTINE PRENATAL (OUTPATIENT)
Dept: OBGYN CLINIC | Age: 38
End: 2018-07-05

## 2018-07-05 VITALS — WEIGHT: 137 LBS | SYSTOLIC BLOOD PRESSURE: 118 MMHG | DIASTOLIC BLOOD PRESSURE: 80 MMHG | BODY MASS INDEX: 28.15 KG/M2

## 2018-07-05 DIAGNOSIS — O09.523 ELDERLY MULTIGRAVIDA IN THIRD TRIMESTER: Primary | ICD-10-CM

## 2018-07-05 PROBLEM — Z34.90 PREGNANCY: Status: ACTIVE | Noted: 2018-07-05

## 2018-07-05 LAB
A1 MICROGLOB PLACENTAL VAG QL: NORMAL
ALBUMIN SERPL-MCNC: 2.5 G/DL (ref 3.5–5)
ALBUMIN/GLOB SERPL: 0.6 {RATIO} (ref 1.1–2.2)
ALP SERPL-CCNC: 319 U/L (ref 45–117)
ALT SERPL-CCNC: 15 U/L (ref 12–78)
ANION GAP SERPL CALC-SCNC: 8 MMOL/L (ref 5–15)
AST SERPL-CCNC: 24 U/L (ref 15–37)
BASOPHILS # BLD: 0 K/UL (ref 0–0.1)
BASOPHILS NFR BLD: 0 % (ref 0–1)
BILIRUB SERPL-MCNC: 0.4 MG/DL (ref 0.2–1)
BUN SERPL-MCNC: 12 MG/DL (ref 6–20)
BUN/CREAT SERPL: 17 (ref 12–20)
CALCIUM SERPL-MCNC: 8.8 MG/DL (ref 8.5–10.1)
CHLORIDE SERPL-SCNC: 104 MMOL/L (ref 97–108)
CO2 SERPL-SCNC: 23 MMOL/L (ref 21–32)
CONTROL LINE PRESENT?: NORMAL
CREAT SERPL-MCNC: 0.71 MG/DL (ref 0.55–1.02)
DIFFERENTIAL METHOD BLD: ABNORMAL
EOSINOPHIL # BLD: 0.2 K/UL (ref 0–0.4)
EOSINOPHIL NFR BLD: 2 % (ref 0–7)
ERYTHROCYTE [DISTWIDTH] IN BLOOD BY AUTOMATED COUNT: 17.1 % (ref 11.5–14.5)
EXPIRATION DATE: NORMAL
GLOBULIN SER CALC-MCNC: 4.3 G/DL (ref 2–4)
GLUCOSE BLD STRIP.AUTO-MCNC: 115 MG/DL (ref 65–100)
GLUCOSE BLD STRIP.AUTO-MCNC: 206 MG/DL (ref 65–100)
GLUCOSE SERPL-MCNC: 72 MG/DL (ref 65–100)
HCT VFR BLD AUTO: 36.1 % (ref 35–47)
HGB BLD-MCNC: 10.8 G/DL (ref 11.5–16)
IMM GRANULOCYTES # BLD: 0 K/UL (ref 0–0.04)
IMM GRANULOCYTES NFR BLD AUTO: 0 % (ref 0–0.5)
INTERNAL NEGATIVE CONTROL: NORMAL
KIT LOT NO.: NORMAL
LYMPHOCYTES # BLD: 1 K/UL (ref 0.8–3.5)
LYMPHOCYTES NFR BLD: 14 % (ref 12–49)
MCH RBC QN AUTO: 23.6 PG (ref 26–34)
MCHC RBC AUTO-ENTMCNC: 29.9 G/DL (ref 30–36.5)
MCV RBC AUTO: 79 FL (ref 80–99)
MONOCYTES # BLD: 0.5 K/UL (ref 0–1)
MONOCYTES NFR BLD: 7 % (ref 5–13)
NEUTS SEG # BLD: 5.7 K/UL (ref 1.8–8)
NEUTS SEG NFR BLD: 77 % (ref 32–75)
NRBC # BLD: 0 K/UL (ref 0–0.01)
NRBC BLD-RTO: 0 PER 100 WBC
PLATELET # BLD AUTO: 283 K/UL (ref 150–400)
PMV BLD AUTO: 10.5 FL (ref 8.9–12.9)
POTASSIUM SERPL-SCNC: 4 MMOL/L (ref 3.5–5.1)
PROT SERPL-MCNC: 6.8 G/DL (ref 6.4–8.2)
RBC # BLD AUTO: 4.57 M/UL (ref 3.8–5.2)
SERVICE CMNT-IMP: ABNORMAL
SERVICE CMNT-IMP: ABNORMAL
SODIUM SERPL-SCNC: 135 MMOL/L (ref 136–145)
WBC # BLD AUTO: 7.4 K/UL (ref 3.6–11)

## 2018-07-05 PROCEDURE — 84112 EVAL AMNIOTIC FLUID PROTEIN: CPT | Performed by: OBSTETRICS & GYNECOLOGY

## 2018-07-05 PROCEDURE — 74011000258 HC RX REV CODE- 258: Performed by: OBSTETRICS & GYNECOLOGY

## 2018-07-05 PROCEDURE — 74011250637 HC RX REV CODE- 250/637: Performed by: OBSTETRICS & GYNECOLOGY

## 2018-07-05 PROCEDURE — 96365 THER/PROPH/DIAG IV INF INIT: CPT

## 2018-07-05 PROCEDURE — 36415 COLL VENOUS BLD VENIPUNCTURE: CPT | Performed by: OBSTETRICS & GYNECOLOGY

## 2018-07-05 PROCEDURE — 96366 THER/PROPH/DIAG IV INF ADDON: CPT

## 2018-07-05 PROCEDURE — 96372 THER/PROPH/DIAG INJ SC/IM: CPT

## 2018-07-05 PROCEDURE — 80053 COMPREHEN METABOLIC PANEL: CPT | Performed by: OBSTETRICS & GYNECOLOGY

## 2018-07-05 PROCEDURE — 85025 COMPLETE CBC W/AUTO DIFF WBC: CPT | Performed by: OBSTETRICS & GYNECOLOGY

## 2018-07-05 PROCEDURE — 99218 HC RM OBSERVATION: CPT

## 2018-07-05 PROCEDURE — 74011250636 HC RX REV CODE- 250/636: Performed by: OBSTETRICS & GYNECOLOGY

## 2018-07-05 PROCEDURE — 82962 GLUCOSE BLOOD TEST: CPT

## 2018-07-05 RX ORDER — BETAMETHASONE SODIUM PHOSPHATE AND BETAMETHASONE ACETATE 3; 3 MG/ML; MG/ML
12 INJECTION, SUSPENSION INTRA-ARTICULAR; INTRALESIONAL; INTRAMUSCULAR; SOFT TISSUE EVERY 24 HOURS
Status: COMPLETED | OUTPATIENT
Start: 2018-07-05 | End: 2018-07-06

## 2018-07-05 RX ORDER — ZOLPIDEM TARTRATE 5 MG/1
5 TABLET ORAL
Status: DISCONTINUED | OUTPATIENT
Start: 2018-07-05 | End: 2018-07-06 | Stop reason: HOSPADM

## 2018-07-05 RX ORDER — MAGNESIUM SULFATE 100 %
4 CRYSTALS MISCELLANEOUS AS NEEDED
Status: DISCONTINUED | OUTPATIENT
Start: 2018-07-05 | End: 2018-07-06 | Stop reason: HOSPADM

## 2018-07-05 RX ORDER — MAGNESIUM SULFATE HEPTAHYDRATE 40 MG/ML
4 INJECTION, SOLUTION INTRAVENOUS ONCE
Status: DISCONTINUED | OUTPATIENT
Start: 2018-07-05 | End: 2018-07-05

## 2018-07-05 RX ORDER — SODIUM CHLORIDE, SODIUM LACTATE, POTASSIUM CHLORIDE, CALCIUM CHLORIDE 600; 310; 30; 20 MG/100ML; MG/100ML; MG/100ML; MG/100ML
999 INJECTION, SOLUTION INTRAVENOUS ONCE
Status: COMPLETED | OUTPATIENT
Start: 2018-07-05 | End: 2018-07-05

## 2018-07-05 RX ORDER — ACETAMINOPHEN 325 MG/1
650 TABLET ORAL
Status: DISCONTINUED | OUTPATIENT
Start: 2018-07-05 | End: 2018-07-06 | Stop reason: HOSPADM

## 2018-07-05 RX ORDER — DEXTROSE 50 % IN WATER (D50W) INTRAVENOUS SYRINGE
12.5-25 AS NEEDED
Status: DISCONTINUED | OUTPATIENT
Start: 2018-07-05 | End: 2018-07-06 | Stop reason: HOSPADM

## 2018-07-05 RX ORDER — SODIUM CHLORIDE, SODIUM LACTATE, POTASSIUM CHLORIDE, CALCIUM CHLORIDE 600; 310; 30; 20 MG/100ML; MG/100ML; MG/100ML; MG/100ML
125 INJECTION, SOLUTION INTRAVENOUS CONTINUOUS
Status: DISCONTINUED | OUTPATIENT
Start: 2018-07-05 | End: 2018-07-06 | Stop reason: HOSPADM

## 2018-07-05 RX ADMIN — SODIUM CHLORIDE, SODIUM LACTATE, POTASSIUM CHLORIDE, AND CALCIUM CHLORIDE 125 ML/HR: 600; 310; 30; 20 INJECTION, SOLUTION INTRAVENOUS at 09:58

## 2018-07-05 RX ADMIN — AMPICILLIN SODIUM 1 G: 1 INJECTION, POWDER, FOR SOLUTION INTRAMUSCULAR; INTRAVENOUS at 18:39

## 2018-07-05 RX ADMIN — ACETAMINOPHEN 650 MG: 325 TABLET ORAL at 22:46

## 2018-07-05 RX ADMIN — AMPICILLIN SODIUM 1 G: 1 INJECTION, POWDER, FOR SOLUTION INTRAMUSCULAR; INTRAVENOUS at 21:46

## 2018-07-05 RX ADMIN — SODIUM CHLORIDE, SODIUM LACTATE, POTASSIUM CHLORIDE, AND CALCIUM CHLORIDE 999 ML/HR: 600; 310; 30; 20 INJECTION, SOLUTION INTRAVENOUS at 08:50

## 2018-07-05 RX ADMIN — AMPICILLIN SODIUM 2 G: 2 INJECTION, POWDER, FOR SOLUTION INTRAMUSCULAR; INTRAVENOUS at 09:57

## 2018-07-05 RX ADMIN — BETAMETHASONE ACETATE AND BETAMETHASONE SODIUM PHOSPHATE 12 MG: 3; 3 INJECTION, SUSPENSION INTRA-ARTICULAR; INTRALESIONAL; INTRAMUSCULAR; SOFT TISSUE at 09:52

## 2018-07-05 RX ADMIN — AMPICILLIN SODIUM 1 G: 1 INJECTION, POWDER, FOR SOLUTION INTRAMUSCULAR; INTRAVENOUS at 14:30

## 2018-07-05 NOTE — PROGRESS NOTES
Pt presents c/o vaginal bleeding today, denies recent intercourse, trauma, or other new problems. + FM, no LOF or uterine contractions. No h/o PTD. Has [facility] appointment this morning. ON exam she had a significant amount of blood on perineum and upper thighs and in vault but no active bleeding. Cervix 80/3/-3. VB likely from PTL. Will send to L&D, start steroids, IVH, send amnisure (likely will not be helpful due to blood) and [facility] consult. If otilio will start Amp and Mg protocol.

## 2018-07-05 NOTE — IP AVS SNAPSHOT
[facility] 
 
 
 [address] [facility] [address] [facility] [address] [phone] Patient: Sanjiv Russ MRN: [id number] DSD:3[birth year]45[id number] About your hospitalization You were admitted on:  2018 You last received care in the:  [facility] 2 LABOR & DELIVERY You were discharged on:  2018 Why you were hospitalized Your primary diagnosis was:  Not on File Your diagnoses also included:  Pregnancy Follow-up Information Follow up With Details Comments Contact Info Allie Eduardo MD   [address] [id number] [phone] Your Scheduled Appointments 2018  8:00 AM EDT  
OB BPP with ULTRASOUND 3 SFM  
[facility]  CENTER ([address]) [address] [place] [address] Road [address] [phone] Discharge Orders None A check radha indicates which time of day the medication should be taken. My Medications ASK your doctor about these medications Instructions Each Dose to Equal  
 Morning Noon Evening Bedtime  
 amitriptyline 25 mg tablet Commonly known as:  ELAVIL Your last dose was: Your next dose is: Take 1 Tab by mouth nightly. 25 mg  
    
   
   
   
  
 amoxicillin 500 mg capsule Commonly known as:  AMOXIL Your last dose was: Your next dose is: Take 500 mg by mouth. 500 mg  
    
   
   
   
  
 glucose blood VI test strips strip Commonly known as:  ONETOUCH ULTRA TEST Your last dose was: Your next dose is: For use with Verio Meter  
     
   
   
   
  
 lancets 30 gauge Misc Commonly known as:  Eward Karuna LANCETS Your last dose was: Your next dose is: For use with One Touch glucose meter * levothyroxine 75 mcg tablet Commonly known as:  SYNTHROID Your last dose was: Your next dose is: Take 1 tablet by mouth once daily before breakfast  
     
   
   
   
  
 * SYNTHROID 88 mcg tablet Generic drug:  levothyroxine Your last dose was: Your next dose is:    
   
   
      
   
   
   
  
 PNV No12-Iron-FA-DSS-OM-3 29 mg iron-1 mg -50 mg Cpkd Your last dose was: Your next dose is: Take  by mouth. SUMAtriptan 25 mg tablet Commonly known as:  IMITREX Your last dose was: Your next dose is: Take 1 Tab by mouth once as needed for Migraine for up to 1 dose. 25 mg  
    
   
   
   
  
 ZyrTEC 10 mg Cap Generic drug:  Cetirizine Your last dose was: Your next dose is: Take  by mouth. * Notice: This list has 2 medication(s) that are the same as other medications prescribed for you. Read the directions carefully, and ask your doctor or other care provider to review them with you. Discharge Instructions  Labor: Care Instructions Your Care Instructions  labor is the start of labor between 20 and 36 weeks of pregnancy. A full-term pregnancy lasts 37 to 42 weeks. In labor, the uterus contracts to open the cervix. This is the first stage of childbirth.  labor can be caused by a problem with the baby, the mother, or both. Often the cause is not known. In some cases, doctors use medicines to try to delay labor until 29 or more weeks of pregnancy. By this time, a baby has grown enough so that problems are not likely. In some cases-such as with a serious infection-it is healthier for the baby to be born early. Your treatment will depend on how far along you are in your pregnancy and on your health and your baby's health. Follow-up care is a key part of your treatment and safety.  Be sure to make and go to all appointments, and call your doctor if you are having problems. It's also a good idea to know your test results and keep a list of the medicines you take. How can you care for yourself at home? · If your doctor prescribed medicines, take them exactly as directed. Call your doctor if you think you are having a problem with your medicine. · Rest until your doctor advises you about activity. He or she will tell you if you should stay in bed most of the time. You may need to arrange for  if you have young children. · Do not have sexual intercourse unless your doctor says it is safe. · Use pads, not tampons, if you have vaginal bleeding. · Make sure to drink plenty of fluids. Dehydration can lead to contractions. If you have kidney, heart, or liver disease and have to limit fluids, talk with your doctor before you increase the amount of fluids you drink. · Do not smoke or allow others to smoke around you. If you need help quitting, talk to your doctor about stop-smoking programs and medicines. These can increase your chances of quitting for good. When should you call for help? Call 911 anytime you think you may need emergency care. For example, call if: 
? · You passed out (lost consciousness). ? · You have severe vaginal bleeding. ? · You have severe pain in your belly or pelvis. ? · You have had fluid gushing or leaking from your vagina and you know or think the umbilical cord is bulging into your vagina. If this happens, immediately get down on your knees so your rear end (buttocks) is higher than your head. This will decrease the pressure on the cord until help arrives. ?Call your doctor now or seek immediate medical care if: 
? · You have signs of preeclampsia, such as: 
¨ Sudden swelling of your face, hands, or feet. ¨ New vision problems (such as dimness or blurring). ¨ A severe headache. ? · You have any vaginal bleeding. ? · You have belly pain or cramping. ? · You have a fever. ? · You have had regular contractions (with or without pain) for an hour. This means that you have 6 or more within 1 hour after you change your position and drink fluids. ? · You have a sudden release of fluid from the vagina. ? · You have low back pain or pelvic pressure that does not go away. ? · You notice that your baby has stopped moving or is moving much less than normal. ? Watch closely for changes in your health, and be sure to contact your doctor if you have any problems. Where can you learn more? Go to http://abby-delbert.info/. Enter Q400 in the search box to learn more about \" Labor: Care Instructions. \" Current as of: 2017 Content Version: 11.4 © 5438-0960 Iconicfuture. Care instructions adapted under license by [employer] (which disclaims liability or warranty for this information). If you have questions about a medical condition or this instruction, always ask your healthcare professional. [place] [address] any warranty or liability for your use of this information. KoudaiharAnthill Activation Thank you for enrolling in [address] Please follow the instructions below to securely access your online medical record. [email or web address] allows you to send messages to your doctor, view your test results, renew your prescriptions, schedule appointments, and more. How Do I Sign Up? 1. In your internet browser, go to [email or web address]. 2. Click on the First Time User? Click Here link in the Sign In box. You will see the New Member Sign Up page. 3. Enter your [email or web address] Access Code exactly as it appears below. You will not need to use this code after youve completed the sign-up process. If you do not sign up before the expiration date, you must request a new code. [email or web address] Access Code: Activation code not generated Current [email or web address] Status: Active 4. Enter the last four digits of your Social Security Number (xxxx) and Date of Birth (mm/dd/yyyy) as indicated and click Submit. You will be taken to the next sign-up page. 5. Create a FabriQate ID. This will be your FabriQate login ID and cannot be changed, so think of one that is secure and easy to remember. 6. Create a FabriQate password. You can change your password at any time. 7. Enter your Password Reset Question and Answer. This can be used at a later time if you forget your password. 8. Enter your e-mail address. You will receive e-mail notification when new information is available in [address] 9. Click Sign Up. You can now view your medical record. Additional Information Remember, FabriQate is NOT to be used for urgent needs. For medical emergencies, dial 911. Now available from your iPhone and Android! Counting Your Baby's Kicks: Care Instructions Your Care Instructions Counting your baby's kicks is one way your doctor can tell that your baby is healthy. Most women-especially in a first pregnancy-feel their baby move for the first time between 16 and 22 weeks. The movement may feel like flutters rather than kicks. Your baby may move more at certain times of the day. When you are active, you may notice less kicking than when you are resting. At your prenatal visits, your doctor will ask whether the baby is active. In your last trimester, your doctor may ask you to count the number of times you feel your baby move. Follow-up care is a key part of your treatment and safety. Be sure to make and go to all appointments, and call your doctor if you are having problems. It's also a good idea to know your test results and keep a list of the medicines you take. How do you count fetal kicks? · A common method of checking your baby's movement is to count the number of kicks or moves you feel in 1 hour.  Ten movements (such as kicks, flutters, or rolls) in 1 hour are normal. Some doctors suggest that you count in the morning until you get to 10 movements. Then you can quit for that day and start again the next day. · Pick your baby's most active time of day to count. This may be any time from morning to evening. · If you do not feel 10 movements in an hour, your baby may be sleeping. Wait for the next hour and count again. When should you call for help? Call your doctor now or seek immediate medical care if: 
? · You noticed that your baby has stopped moving or is moving much less than normal. ? Watch closely for changes in your health, and be sure to contact your doctor if you have any problems. Where can you learn more? Go to http://abby-delbert.info/. Enter T070 in the search box to learn more about \"Counting Your Baby's Kicks: Care Instructions. \" Current as of: March 16, 2017 Content Version: 11.4 © 8928-8969 Ikaria. Care instructions adapted under license by [employer] (which disclaims liability or warranty for this information). If you have questions about a medical condition or this instruction, always ask your healthcare professional. [place] [address] any warranty or liability for your use of this information. Weeks 32 to 34 of Your Pregnancy: Care Instructions Your Care Instructions During the last few weeks of your pregnancy, you may have more aches and pains. It's important to rest when you can. Your growing baby is putting more pressure on your bladder. So you may need to urinate more often. Hemorrhoids are also common. These are painful, itchy veins in the rectal area. In the 36th week, most women have a test for group B streptococcus (GBS). GBS is a common bacteria that can live in the vagina and rectum. It can make your baby sick after birth.  If you test positive, you will get antibiotics during labor. These will keep your baby from getting the bacteria. You may want to talk with your doctor about banking your baby's umbilical cord blood. This is the blood left in the cord after birth. If you want to save this blood, you must arrange it ahead of time. You can't decide at the last minute. If you haven't already had the Tdap shot during this pregnancy, talk to your doctor about getting it. It will help protect your  against pertussis infection. Follow-up care is a key part of your treatment and safety. Be sure to make and go to all appointments, and call your doctor if you are having problems. It's also a good idea to know your test results and keep a list of the medicines you take. How can you care for yourself at home? Ease hemorrhoids · Get more liquids, fruits, vegetables, and fiber in your diet. This will help keep your stools soft. · Avoid sitting for too long. Lie on your left side several times a day. · Clean yourself with soft, moist toilet paper. Or you can use witch hazel pads or personal hygiene pads. · If you are uncomfortable, try ice packs. Or you can sit in a warm sitz bath. Do these for 20 minutes at a time, as needed. · Use hydrocortisone cream for pain and itching. Two examples are Anusol and Preparation H Hydrocortisone. · Ask your doctor about taking an over-the-counter stool softener. Consider breastfeeding · Experts recommend that women breastfeed for 1 year or longer. Breast milk is the perfect food for babies. · Breast milk is easier for babies to digest than formula. And it is always available, just the right temperature, and free. · In general, babies who are  are healthier than formula-fed babies. ¨  babies are less likely to get ear infections, colds, diarrhea, and pneumonia. ¨  babies who are fed only breast milk are less likely to get asthma and allergies. ¨  babies are less likely to be obese. ¨  babies are less likely to get diabetes or heart disease. · Women who breastfeed have less bleeding after the birth. Their uteruses also shrink back faster. · Some women who breastfeed lose weight faster. Making milk burns calories. · Breastfeeding can lower your risk of breast cancer, ovarian cancer, and osteoporosis. Decide about circumcision for boys · As you make this decision, it may help to think about your personal, Sabianism, and family traditions. You get to decide if you will keep your son's penis natural or if he will be circumcised. · If you decide that you would like to have your baby circumcised, talk with your doctor. You can share your concerns about pain. And you can discuss your preferences for anesthesia. Where can you learn more? Go to http://abby-delbert.info/. Enter Z411 in the search box to learn more about \"Weeks 32 to 34 of Your Pregnancy: Care Instructions. \" Current as of: March 16, 2017 Content Version: 11.4 © 3565-2941 EcoloCap. Care instructions adapted under license by [employer] (which disclaims liability or warranty for this information). If you have questions about a medical condition or this instruction, always ask your healthcare professional. [place] [address] any warranty or liability for your use of this information. Keep follow up apt with Dr Malena Sylvester Introducing [facility]! Dear Olivia Pritchard: Thank you for requesting a CustomInk account. Our records indicate that you already have an active CustomInk account. You can access your account anytime at [email or web address] Did you know that you can access your hospital and ER discharge instructions at any time in CustomInk? You can also review all of your test results from your hospital stay or ER visit. Additional Information If you have questions, please visit the Frequently Asked Questions section of the [employer] Private website at [email or web address] Remember, Juhayna Food Industriest is NOT to be used for urgent needs. For medical emergencies, dial 911. Now available from your iPhone and Android! Introducing Jorge A Johnson As a [employer] patient, I wanted to make you aware of our electronic visit tool called Jorge A Johnson. [employer] 24/7 allows you to connect within minutes with a medical provider 24 hours a day, seven days a week via a mobile device or tablet or logging into a secure website from your computer. You can access Jorge A Johnson from anywhere in the [place]. A virtual visit might be right for you when you have a simple condition and feel like you just dont want to get out of bed, or cant get away from work for an appointment, when your regular [employer] provider is not available (evenings, weekends or holidays), or when youre out of town and need minor care. Electronic visits cost only $49 and if the [employer] 24/7 provider determines a prescription is needed to treat your condition, one can be electronically transmitted to a nearby pharmacy*. Please take a moment to enroll today if you have not already done so. The enrollment process is free and takes just a few minutes. To enroll, please download the [employer] 24/7 truman to your tablet or phone, or visit [email or web address] to enroll on your computer. And, as an [address] [place] [address] patient with a [employer] account, the results of your visits will be scanned into your electronic medical record and your primary care provider will be able to view the scanned results. We urge you to continue to see your regular [employer] provider for your ongoing medical care. And while your primary care provider may not be the one available when you seek a Jorge A Johnson virtual visit, the peace of mind you get from getting a real diagnosis real time can be priceless. For more information on Jorge A Johnson, view our Frequently Asked Questions (FAQs) at [email or web address]. Sincerely, 
 
Ariadne Pendleton MD 
Chief Medical Officer [employer] *:  certain medications cannot be prescribed via Jorge A Johnson Providers Seen During Your Hospitalization Provider Specialty Primary office phone Gerda Rachel MD Obstetrics & Gynecology [phone] Your Primary Care Physician (PCP) Primary Care Physician Office Phone Office Fax Julia Saunders [phone] You are allergic to the following No active allergies Recent Documentation Height Weight Breastfeeding? BMI OB Status Smoking Status 1.486 m 62.1 kg No 28.15 kg/m2 Pregnant Never Smoker Emergency Contacts Name Discharge Info Relation Home Work Mobile Jeovanny Orozco DISCHARGE CAREGIVER [3] Other Relative [6] [phone] Patient Belongings The following personal items are in your possession at time of discharge: 
  Dental Appliances: Other (comment) (crown- back left)  Visual Aid: None      Home Medications: None   Jewelry: With patient  Clothing: None    Other Valuables: Cell Phone, At bedside, [address] [place] [address] Please provide this summary of care documentation to your next provider. Signatures-by signing, you are acknowledging that this After Visit Summary has been reviewed with you and you have received a copy. Patient Signature:  ____________________________________________________________ Date:  ____________________________________________________________  
  
Deepthi Malini Provider Signature:  ____________________________________________________________ Date:  ____________________________________________________________

## 2018-07-05 NOTE — CONSULTS
Maternal-Fetal Medicine    Ms. Mehdi Ram is admitted with  labor. She had mild vaginal bleeding today, but no uterine contractions. She does not have fever or abdominal pain. Since admission, the patient received the first dose of betamethasone. She is also getting prophylactic antibiotics. On admission examination, the cervix is 3 cm dilated. Patient is being [facility] at the [facility] On ultrasound performed 2 weeks ago, the estimated fetal weight was at the 10th percentile. Weekly  testing was planned. Her previous child reportedly weighed 5-11 at birth. She has gestational diabetes and the patient reports her blood glucose levels are within normal range. She also has hypothyroidism and takes levothyroxine 88 micrograms daily. Advanced maternal age: On cell-free fetal DNA screening, the risks of aneuploidies were not increased. P/E: Patient is comfortable; not in pain. Vitals stable. Abd: Soft gravid uterus; no tenderness; no contractions. NST performed in L&D has been reassuring. Labs: Hb 10.8, Hct 36.1, WBC 7.4, , LFTs normal.    On ultrasound, amniotic fluid is normal and good fetal activity is seen. Cephalic presentation.  testing is reassuring. Umbilical artery Doppler showed forward diastolic flow. Placenta looks normal with no evidence of retroplacental clots. I counseled the patient on the following:   labor:  -Explained tocolysis (magnesium sulfate), its benefits (short-term up to 48 hours) and side-effects.  -Explained the benefits of steroids (lung maturity)  -Discussed the role of antibiotics until genital culture results are available.  If GBS positive, she will require penicillin prophylaxis during labor to prevent early-onset GBS infection in the .  -Mode of delivery: Explained the presentation and informed her that in cephalic presentation, vaginal delivery does not increase complications.  -In-patient management for now.   -Explained that if membranes rupture, delivery would be recommended at 34 weeks. -Vaginal bleeding is more-likely from  labor. Reassured her of ultrasound findings. Gestational diabetes:  -Explained that  corticosteroids may increase blood glucose values and she may require insulin coverage (sliding scale). We would expect it to resolve after 72 hours of steroid administration. Recommendations:  -Complete steroid course. -Follow GBS culture. If GBS is negative, discontinue antibiotics. Also, if  labor does not advance, discontinue antibiotics.  -Sliding scale insulin coverage.  - consultation.

## 2018-07-05 NOTE — IP AVS SNAPSHOT
[address] 
[phone] Patient: Rosey Izaguirre MRN: [id number] VVM:6/81/[birth year]134 A check radha indicates which time of day the medication should be taken. My Medications ASK your doctor about these medications Instructions Each Dose to Equal  
 Morning Noon Evening Bedtime  
 amitriptyline 25 mg tablet Commonly known as:  ELAVIL Your last dose was: Your next dose is: Take 1 Tab by mouth nightly. 25 mg  
    
   
   
   
  
 amoxicillin 500 mg capsule Commonly known as:  AMOXIL Your last dose was: Your next dose is: Take 500 mg by mouth. 500 mg  
    
   
   
   
  
 glucose blood VI test strips strip Commonly known as:  ONETOUCH ULTRA TEST Your last dose was: Your next dose is: For use with Verio Meter  
     
   
   
   
  
 lancets 30 gauge Misc Commonly known as:  ZuzuChe Squires LANCETS Your last dose was: Your next dose is: For use with One Touch glucose meter * levothyroxine 75 mcg tablet Commonly known as:  SYNTHROID Your last dose was: Your next dose is: Take 1 tablet by mouth once daily before breakfast  
     
   
   
   
  
 * SYNTHROID 88 mcg tablet Generic drug:  levothyroxine Your last dose was: Your next dose is:    
   
   
      
   
   
   
  
 PNV No12-Iron-FA-DSS-OM-3 29 mg iron-1 mg -50 mg Cpkd Your last dose was: Your next dose is: Take  by mouth. SUMAtriptan 25 mg tablet Commonly known as:  IMITREX Your last dose was: Your next dose is: Take 1 Tab by mouth once as needed for Migraine for up to 1 dose. 25 mg  
    
   
   
   
  
 ZyrTEC 10 mg Cap Generic drug:  Cetirizine Your last dose was: Your next dose is: Take  by mouth. * Notice: This list has 2 medication(s) that are the same as other medications prescribed for you. Read the directions carefully, and ask your doctor or other care provider to review them with you.

## 2018-07-05 NOTE — PROGRESS NOTES
0830 Patient arrived to labor and delivery after being evaluated in the office today for vaginal bleeding. 32 weeks 6 days and . Denies any recent intercourse or heavy activity. MD noted vaginal bleeding to be bright red and on perineum and upper thighs. MD performed SVE and found patient to be 3/80/-3. Sent to labor for IV hydration, betamethasone, as well as run an amnisure (may not be valid due to presence of blood). Would also like patient to be evaluated by  center doctors. RN to determine if patient is otilio and will start ampicillin for GBS prophylaxis and magnesium for PTL.     0845 Placed on EFM at this time. Patient states she is feeling tightening and cramping in her abdomen. 0850 IV placed and LR bolus initiated. Labs drawn and sent to lab.     3904 Since patient is otilio, orders received for ampicillin and magnesium. 9863 Magnesium discontinued per MD.     Skye Bach collected and processed by RN. Swab covered with bright red blood. Test resulted positive, however, presence of blood known to give false positives with amnisure. Amnisure results invalid. 3571 First dose of betamethasone given in left gluteus muscle. 0957 Ampicillin given. [address] fluid. Patient wheeled to [facility]. [id number] Patient returned to room from [facility] 120.     1222 Replaced on EFM. Patient stating that her contractions are feeling significantly better. 9439 [facility] Notified Dr. Georgina Gibson. MD will come recheck patient shortly. Per patient, [facility] wants patient to stay at least until she gets second dose of betamethasone. MD agreeable. 1320 Patient given juice and crackers since she has not eaten all day. 1430 Dr. Georgina Gibson at bedside for SVE. No cervical change made. May d/c continuous monitoring. NST BID. Patient may have ambien at bedtime. May d/c continuous LR once patient has gotten complete second bag of fluid. May have gestational diabetic diet. Observation overnight.  Removed from EFM at this time per MD.     5848 Patient resting. States she has a headache but thinks its because she has not really eaten much today. Encouraged patient to eat some lunch. States her  is on his way with food. Offered tylenol for HA but declined at this time. 1645 Patient eating. Will do BG check in two hours. 1733 Resting in bed and watching TV. No complaints. States her bleeding is roughly the same as it has been throughout the day and her contractions have remained minimal.     1838 BG check performed. No complaints. States contractions are still tolerable. 200 Notified Dr. Tiffanie Marks of elevated BG. No new orders. MD states to recheck in 2 hours. 1910 Bedside and Verbal shift change report given to Kenisha Barnhart RN (oncoming nurse) by Rebecca Donald RN (offgoing nurse). Report included the following information SBAR, Kardex, Intake/Output, MAR, Accordion, Recent Results and Med Rec Status.

## 2018-07-06 VITALS
HEART RATE: 71 BPM | DIASTOLIC BLOOD PRESSURE: 68 MMHG | BODY MASS INDEX: 27.62 KG/M2 | TEMPERATURE: 98.5 F | HEIGHT: 59 IN | SYSTOLIC BLOOD PRESSURE: 106 MMHG | RESPIRATION RATE: 16 BRPM | WEIGHT: 137 LBS

## 2018-07-06 LAB
GLUCOSE BLD STRIP.AUTO-MCNC: 101 MG/DL (ref 65–100)
SERVICE CMNT-IMP: ABNORMAL

## 2018-07-06 PROCEDURE — 99218 HC RM OBSERVATION: CPT

## 2018-07-06 PROCEDURE — 96366 THER/PROPH/DIAG IV INF ADDON: CPT

## 2018-07-06 PROCEDURE — 59025 FETAL NON-STRESS TEST: CPT

## 2018-07-06 PROCEDURE — 96372 THER/PROPH/DIAG INJ SC/IM: CPT

## 2018-07-06 PROCEDURE — 74011000258 HC RX REV CODE- 258: Performed by: OBSTETRICS & GYNECOLOGY

## 2018-07-06 PROCEDURE — 74011250636 HC RX REV CODE- 250/636: Performed by: OBSTETRICS & GYNECOLOGY

## 2018-07-06 PROCEDURE — 82962 GLUCOSE BLOOD TEST: CPT

## 2018-07-06 RX ADMIN — AMPICILLIN SODIUM 1 G: 1 INJECTION, POWDER, FOR SOLUTION INTRAMUSCULAR; INTRAVENOUS at 05:50

## 2018-07-06 RX ADMIN — BETAMETHASONE ACETATE AND BETAMETHASONE SODIUM PHOSPHATE 12 MG: 3; 3 INJECTION, SUSPENSION INTRA-ARTICULAR; INTRALESIONAL; INTRAMUSCULAR; SOFT TISSUE at 09:06

## 2018-07-06 RX ADMIN — AMPICILLIN SODIUM 1 G: 1 INJECTION, POWDER, FOR SOLUTION INTRAMUSCULAR; INTRAVENOUS at 01:43

## 2018-07-06 NOTE — PROGRESS NOTES
1900: Bedside shift change report given to Luis Antonio So (oncoming nurse) by Abbey Izaguirre (offgoing nurse). Report included the following information SBAR, Kardex, Intake/Output, MAR and Recent Results. 2115:  Pt voided. Scant amount of dark red blood with wiping and small clot passed when urinating. 2140:  VSS. No c/o pain. . NST reactive.

## 2018-07-06 NOTE — PROGRESS NOTES
Ante Partum Progress Note    Dexter Barrera  33w0d        Patient states she does not have abdominal pain  , contractions, vaginal leaking of fluid  and her vaginal bleeding has resolved. Vitals:  Visit Vitals    /68    Pulse 71    Temp 98.5 °F (36.9 °C)    Resp 16    Ht 4' 10.5\" (1.486 m)    Wt 137 lb (62.1 kg)    LMP  (LMP Unknown)    Breastfeeding No    BMI 28.15 kg/m2     Temp (24hrs), Av.3 °F (36.8 °C), Min:97.8 °F (36.6 °C), Max:98.5 °F (36.9 °C)      Last 24hr Input/Output:  No intake or output data in the 24 hours ending 18 0912     Non stress test: Reactive, baseline 150, moderate variability, + accels, no decels, no contractions, monitoroed > 30  minutes    Uterine Activity: None     Exam:  Patient without distress. Abdomen, fundus soft non-tender     Extremities, no redness or tenderness               Additional Exam: Abdomen soft, non-tender and Fundus soft and non tender    Labs:     Lab Results   Component Value Date/Time    WBC 7.4 2018 09:00 AM    WBC 8.2 2018 01:28 PM    WBC 4.8 2018 10:01 AM    WBC 6.0 2014 03:01 PM    HGB 10.8 (L) 2018 09:00 AM    HGB 10.7 (L) 2018 01:28 PM    HGB 11.5 2018 10:01 AM    HGB 10.1 (L) 2014 03:01 PM    HCT 36.1 2018 09:00 AM    HCT 34.9 2018 01:28 PM    HCT 36.4 2018 10:01 AM    HCT 32.7 (L) 2014 03:01 PM    PLATELET 682  09:00 AM    PLATELET 395  01:28 PM    PLATELET 283  10:01 AM    PLATELET 686 10/2 03:01 PM    Hgb, External 11.5 2018    Hct, External 36.4 2018    Platelet cnt., External 348 2018       Recent Results (from the past 24 hour(s))   RUPTURE OF FETAL MEMBRANES, POC    Collection Time: 18  9:35 AM   Result Value Ref Range    Rupture of fetal membrane Invalid Negative    Control line present?  Acceptable     Internal negative control Acceptable     Kit Lot No. [id number]     Expiration date 2020-10-12    GLUCOSE, POC    Collection Time: 07/05/18  6:38 PM   Result Value Ref Range    Glucose (POC) 206 (H) 65 - 100 mg/dL    Performed by Merline Gails, POC    Collection Time: 07/05/18  9:44 PM   Result Value Ref Range    Glucose (POC) 115 (H) 65 - 100 mg/dL    Performed by Jeneal Clubs    GLUCOSE, POC    Collection Time: 07/06/18  6:43 AM   Result Value Ref Range    Glucose (POC) 101 (H) 65 - 100 mg/dL    Performed by Enthrill [employer]ributions        Assessment/Plan  33w0d   Vaginal Bleeding, PCD- no evidence of active PTL. Will complete course of steroids today and plan D/C to home. Routine precautions discussed.

## 2018-07-06 NOTE — PROGRESS NOTES
0700 sbar report received from jerman Andrade rn  [id number] am assessment performed  0945 dr Allyson Cheatham in and assessed pt, aware of am assessment of two scant streaks of light tan on kena pad, and at  0400 pt reported one small dark clot with scant amount of blood noted at that time.   Order received may  discharge pt home  1012 reactive nst  1025 discharge instructions given including s/sx of labor and fetal kick counts, pt aware to follow up with ob for routine prenatal care and mfm next week, pt discharged home

## 2018-07-06 NOTE — DISCHARGE INSTRUCTIONS
Labor: Care Instructions  Your Care Instructions     labor is the start of labor between 21 and 36 weeks of pregnancy. A full-term pregnancy lasts 37 to 42 weeks. In labor, the uterus contracts to open the cervix. This is the first stage of childbirth.  labor can be caused by a problem with the baby, the mother, or both. Often the cause is not known. In some cases, doctors use medicines to try to delay labor until 29 or more weeks of pregnancy. By this time, a baby has grown enough so that problems are not likely. In some cases-such as with a serious infection-it is healthier for the baby to be born early. Your treatment will depend on how far along you are in your pregnancy and on your health and your baby's health. Follow-up care is a key part of your treatment and safety. Be sure to make and go to all appointments, and call your doctor if you are having problems. It's also a good idea to know your test results and keep a list of the medicines you take. How can you care for yourself at home? · If your doctor prescribed medicines, take them exactly as directed. Call your doctor if you think you are having a problem with your medicine. · Rest until your doctor advises you about activity. He or she will tell you if you should stay in bed most of the time. You may need to arrange for  if you have young children. · Do not have sexual intercourse unless your doctor says it is safe. · Use pads, not tampons, if you have vaginal bleeding. · Make sure to drink plenty of fluids. Dehydration can lead to contractions. If you have kidney, heart, or liver disease and have to limit fluids, talk with your doctor before you increase the amount of fluids you drink. · Do not smoke or allow others to smoke around you. If you need help quitting, talk to your doctor about stop-smoking programs and medicines. These can increase your chances of quitting for good.   When should you call for help?  Call 911 anytime you think you may need emergency care. For example, call if:  ? · You passed out (lost consciousness). ? · You have severe vaginal bleeding. ? · You have severe pain in your belly or pelvis. ? · You have had fluid gushing or leaking from your vagina and you know or think the umbilical cord is bulging into your vagina. If this happens, immediately get down on your knees so your rear end (buttocks) is higher than your head. This will decrease the pressure on the cord until help arrives. ?Call your doctor now or seek immediate medical care if:  ? · You have signs of preeclampsia, such as:  ¨ Sudden swelling of your face, hands, or feet. ¨ New vision problems (such as dimness or blurring). ¨ A severe headache. ? · You have any vaginal bleeding. ? · You have belly pain or cramping. ? · You have a fever. ? · You have had regular contractions (with or without pain) for an hour. This means that you have 6 or more within 1 hour after you change your position and drink fluids. ? · You have a sudden release of fluid from the vagina. ? · You have low back pain or pelvic pressure that does not go away. ? · You notice that your baby has stopped moving or is moving much less than normal.   ? Watch closely for changes in your health, and be sure to contact your doctor if you have any problems. Where can you learn more? Go to http://abby-delbert.info/. Enter Q400 in the search box to learn more about \" Labor: Care Instructions. \"  Current as of: 2017  Content Version: 11.4  © 5811-9603 [employer]. Care instructions adapted under license by [employer] (which disclaims liability or warranty for this information). If you have questions about a medical condition or this instruction, always ask your healthcare professional. [address] any warranty or liability for your use of this information.   Wipster Activation    Thank you for enrolling in [address] Please follow the instructions below to securely access your online medical record. [employer] allows you to send messages to your doctor, view your test results, renew your prescriptions, schedule appointments, and more. How Do I Sign Up? 1. In your internet browser, go to [email or web address]. 2. Click on the First Time User? Click Here link in the Sign In box. You will see the New Member Sign Up page. 3. Enter your [employer] Access Code exactly as it appears below. You will not need to use this code after youve completed the sign-up process. If you do not sign up before the expiration date, you must request a new code. [employer] Access Code: Activation code not generated  Current [employer] Status: Active     4. Enter the last four digits of your Social Security Number (xxxx) and Date of Birth (mm/dd/yyyy) as indicated and click Submit. You will be taken to the next sign-up page. 5. Create a [employer] ID. This will be your [employer] login ID and cannot be changed, so think of one that is secure and easy to remember. 6. Create a [employer] password. You can change your password at any time. 7. Enter your Password Reset Question and Answer. This can be used at a later time if you forget your password. 8. Enter your e-mail address. You will receive e-mail notification when new information is available in [address] 9. Click Sign Up. You can now view your medical record. Additional Information    Remember, [employer] is NOT to be used for urgent needs. For medical emergencies, dial 911. Now available from your iPhone and Android! Counting Your Baby's Kicks: Care Instructions  Your Care Instructions    Counting your baby's kicks is one way your doctor can tell that your baby is healthy. Most women-especially in a first pregnancy-feel their baby move for the first time between 16 and 22 weeks.  The movement may feel like flutters rather than kicks. Your baby may move more at certain times of the day. When you are active, you may notice less kicking than when you are resting. At your prenatal visits, your doctor will ask whether the baby is active. In your last trimester, your doctor may ask you to count the number of times you feel your baby move. Follow-up care is a key part of your treatment and safety. Be sure to make and go to all appointments, and call your doctor if you are having problems. It's also a good idea to know your test results and keep a list of the medicines you take. How do you count fetal kicks? · A common method of checking your baby's movement is to count the number of kicks or moves you feel in 1 hour. Ten movements (such as kicks, flutters, or rolls) in 1 hour are normal. Some doctors suggest that you count in the morning until you get to 10 movements. Then you can quit for that day and start again the next day. · Pick your baby's most active time of day to count. This may be any time from morning to evening. · If you do not feel 10 movements in an hour, your baby may be sleeping. Wait for the next hour and count again. When should you call for help? Call your doctor now or seek immediate medical care if:  ? · You noticed that your baby has stopped moving or is moving much less than normal.   ? Watch closely for changes in your health, and be sure to contact your doctor if you have any problems. Where can you learn more? Go to http://abby-delbert.info/. Enter A092 in the search box to learn more about \"Counting Your Baby's Kicks: Care Instructions. \"  Current as of: March 16, 2017  Content Version: 11.4  © 3421-1997 [employer]. Care instructions adapted under license by [employer] (which disclaims liability or warranty for this information).  If you have questions about a medical condition or this instruction, always ask your healthcare professional. Rom[employer] disclaims any warranty or liability for your use of this information. Weeks 32 to 34 of Your Pregnancy: Care Instructions  Your Care Instructions    During the last few weeks of your pregnancy, you may have more aches and pains. It's important to rest when you can. Your growing baby is putting more pressure on your bladder. So you may need to urinate more often. Hemorrhoids are also common. These are painful, itchy veins in the rectal area. In the 36th week, most women have a test for group B streptococcus (GBS). GBS is a common bacteria that can live in the vagina and rectum. It can make your baby sick after birth. If you test positive, you will get antibiotics during labor. These will keep your baby from getting the bacteria. You may want to talk with your doctor about banking your baby's umbilical cord blood. This is the blood left in the cord after birth. If you want to save this blood, you must arrange it ahead of time. You can't decide at the last minute. If you haven't already had the Tdap shot during this pregnancy, talk to your doctor about getting it. It will help protect your  against pertussis infection. Follow-up care is a key part of your treatment and safety. Be sure to make and go to all appointments, and call your doctor if you are having problems. It's also a good idea to know your test results and keep a list of the medicines you take. How can you care for yourself at home? Ease hemorrhoids  · Get more liquids, fruits, vegetables, and fiber in your diet. This will help keep your stools soft. · Avoid sitting for too long. Lie on your left side several times a day. · Clean yourself with soft, moist toilet paper. Or you can use witch hazel pads or personal hygiene pads. · If you are uncomfortable, try ice packs. Or you can sit in a warm sitz bath. Do these for 20 minutes at a time, as needed. · Use hydrocortisone cream for pain and itching.  Two examples are Anusol and Preparation H Hydrocortisone. · Ask your doctor about taking an over-the-counter stool softener. Consider breastfeeding  · Experts recommend that women breastfeed for 1 year or longer. Breast milk is the perfect food for babies. · Breast milk is easier for babies to digest than formula. And it is always available, just the right temperature, and free. · In general, babies who are  are healthier than formula-fed babies. ¨  babies are less likely to get ear infections, colds, diarrhea, and pneumonia. ¨  babies who are fed only breast milk are less likely to get asthma and allergies. ¨  babies are less likely to be obese. ¨  babies are less likely to get diabetes or heart disease. · Women who breastfeed have less bleeding after the birth. Their uteruses also shrink back faster. · Some women who breastfeed lose weight faster. Making milk burns calories. · Breastfeeding can lower your risk of breast cancer, ovarian cancer, and osteoporosis. Decide about circumcision for boys  · As you make this decision, it may help to think about your personal, Anglican, and family traditions. You get to decide if you will keep your son's penis natural or if he will be circumcised. · If you decide that you would like to have your baby circumcised, talk with your doctor. You can share your concerns about pain. And you can discuss your preferences for anesthesia. Where can you learn more? Go to http://abby-delbert.info/. Enter G976 in the search box to learn more about \"Weeks 32 to 34 of Your Pregnancy: Care Instructions. \"  Current as of: March 16, 2017  Content Version: 11.4  © 4842-0031 [employer]. Care instructions adapted under license by [employer] (which disclaims liability or warranty for this information).  If you have questions about a medical condition or this instruction, always ask your healthcare professional. Jessica Antonio[employer] disclaims any warranty or liability for your use of this information.     Keep follow up apt with Dr Ba Knight

## 2018-07-07 ENCOUNTER — HOSPITAL ENCOUNTER (INPATIENT)
Age: 38
LOS: 6 days | Discharge: HOME | End: 2018-07-13
Attending: OBSTETRICS & GYNECOLOGY | Admitting: OBSTETRICS & GYNECOLOGY
Payer: COMMERCIAL

## 2018-07-07 DIAGNOSIS — R52 POSTPARTUM PAIN: Primary | ICD-10-CM

## 2018-07-07 PROBLEM — O60.02 PRETERM LABOR IN SECOND TRIMESTER: Status: ACTIVE | Noted: 2018-07-07

## 2018-07-07 LAB
APPEARANCE UR: CLEAR
BACTERIA URNS QL MICRO: NEGATIVE /HPF
BASOPHILS # BLD: 0 K/UL (ref 0–0.1)
BASOPHILS NFR BLD: 0 % (ref 0–1)
BILIRUB UR QL: NEGATIVE
COLOR UR: ABNORMAL
DIFFERENTIAL METHOD BLD: ABNORMAL
EOSINOPHIL # BLD: 0 K/UL (ref 0–0.4)
EOSINOPHIL NFR BLD: 0 % (ref 0–7)
EPITH CASTS URNS QL MICRO: ABNORMAL /LPF
ERYTHROCYTE [DISTWIDTH] IN BLOOD BY AUTOMATED COUNT: 17 % (ref 11.5–14.5)
GLUCOSE BLD STRIP.AUTO-MCNC: 81 MG/DL (ref 65–100)
GLUCOSE UR STRIP.AUTO-MCNC: NEGATIVE MG/DL
HCT VFR BLD AUTO: 32.1 % (ref 35–47)
HGB BLD-MCNC: 9.6 G/DL (ref 11.5–16)
HGB UR QL STRIP: ABNORMAL
IMM GRANULOCYTES # BLD: 0.1 K/UL (ref 0–0.04)
IMM GRANULOCYTES NFR BLD AUTO: 1 % (ref 0–0.5)
KETONES UR QL STRIP.AUTO: 15 MG/DL
LEUKOCYTE ESTERASE UR QL STRIP.AUTO: NEGATIVE
LYMPHOCYTES # BLD: 1.4 K/UL (ref 0.8–3.5)
LYMPHOCYTES NFR BLD: 14 % (ref 12–49)
MCH RBC QN AUTO: 23.5 PG (ref 26–34)
MCHC RBC AUTO-ENTMCNC: 29.9 G/DL (ref 30–36.5)
MCV RBC AUTO: 78.7 FL (ref 80–99)
MONOCYTES # BLD: 0.8 K/UL (ref 0–1)
MONOCYTES NFR BLD: 8 % (ref 5–13)
NEUTS SEG # BLD: 7.8 K/UL (ref 1.8–8)
NEUTS SEG NFR BLD: 77 % (ref 32–75)
NITRITE UR QL STRIP.AUTO: NEGATIVE
NRBC # BLD: 0 K/UL (ref 0–0.01)
NRBC BLD-RTO: 0 PER 100 WBC
PH UR STRIP: 6.5 [PH] (ref 5–8)
PLATELET # BLD AUTO: 291 K/UL (ref 150–400)
PMV BLD AUTO: 10.8 FL (ref 8.9–12.9)
PROT UR STRIP-MCNC: NEGATIVE MG/DL
RBC # BLD AUTO: 4.08 M/UL (ref 3.8–5.2)
RBC #/AREA URNS HPF: ABNORMAL /HPF (ref 0–5)
SERVICE CMNT-IMP: NORMAL
SP GR UR REFRACTOMETRY: <1.005 (ref 1–1.03)
UA: UC IF INDICATED,UAUC: ABNORMAL
UROBILINOGEN UR QL STRIP.AUTO: 0.2 EU/DL (ref 0.2–1)
WBC # BLD AUTO: 10.1 K/UL (ref 3.6–11)
WBC URNS QL MICRO: ABNORMAL /HPF (ref 0–4)

## 2018-07-07 PROCEDURE — 77030032490 HC SLV COMPR SCD KNE COVD -B

## 2018-07-07 PROCEDURE — 87081 CULTURE SCREEN ONLY: CPT | Performed by: OBSTETRICS & GYNECOLOGY

## 2018-07-07 PROCEDURE — 74011250636 HC RX REV CODE- 250/636

## 2018-07-07 PROCEDURE — 74011250636 HC RX REV CODE- 250/636: Performed by: OBSTETRICS & GYNECOLOGY

## 2018-07-07 PROCEDURE — 77010026064 HC OXYGEN INFANT MED AIR MIN: Performed by: OBSTETRICS & GYNECOLOGY

## 2018-07-07 PROCEDURE — 36415 COLL VENOUS BLD VENIPUNCTURE: CPT | Performed by: OBSTETRICS & GYNECOLOGY

## 2018-07-07 PROCEDURE — 81001 URINALYSIS AUTO W/SCOPE: CPT | Performed by: OBSTETRICS & GYNECOLOGY

## 2018-07-07 PROCEDURE — 75410000000 HC DELIVERY VAGINAL/SINGLE: Performed by: OBSTETRICS & GYNECOLOGY

## 2018-07-07 PROCEDURE — 75410000002 HC LABOR FEE PER 1 HR: Performed by: OBSTETRICS & GYNECOLOGY

## 2018-07-07 PROCEDURE — 99284 EMERGENCY DEPT VISIT MOD MDM: CPT

## 2018-07-07 PROCEDURE — 75410000003 HC RECOV DEL/VAG/CSECN EA 0.5 HR: Performed by: OBSTETRICS & GYNECOLOGY

## 2018-07-07 PROCEDURE — 85025 COMPLETE CBC W/AUTO DIFF WBC: CPT | Performed by: OBSTETRICS & GYNECOLOGY

## 2018-07-07 PROCEDURE — 76815 OB US LIMITED FETUS(S): CPT | Performed by: OBSTETRICS & GYNECOLOGY

## 2018-07-07 PROCEDURE — 65270000029 HC RM PRIVATE

## 2018-07-07 PROCEDURE — 96361 HYDRATE IV INFUSION ADD-ON: CPT

## 2018-07-07 PROCEDURE — 82962 GLUCOSE BLOOD TEST: CPT

## 2018-07-07 RX ORDER — ACETAMINOPHEN 325 MG/1
650 TABLET ORAL
Status: DISCONTINUED | OUTPATIENT
Start: 2018-07-07 | End: 2018-07-11

## 2018-07-07 RX ORDER — LEVOTHYROXINE SODIUM 88 UG/1
88 TABLET ORAL
Status: DISCONTINUED | OUTPATIENT
Start: 2018-07-08 | End: 2018-07-13 | Stop reason: HOSPADM

## 2018-07-07 RX ORDER — SODIUM CHLORIDE, SODIUM LACTATE, POTASSIUM CHLORIDE, CALCIUM CHLORIDE 600; 310; 30; 20 MG/100ML; MG/100ML; MG/100ML; MG/100ML
75 INJECTION, SOLUTION INTRAVENOUS CONTINUOUS
Status: DISCONTINUED | OUTPATIENT
Start: 2018-07-07 | End: 2018-07-08

## 2018-07-07 RX ORDER — MAGNESIUM SULFATE HEPTAHYDRATE 40 MG/ML
4 INJECTION, SOLUTION INTRAVENOUS ONCE
Status: COMPLETED | OUTPATIENT
Start: 2018-07-07 | End: 2018-07-07

## 2018-07-07 RX ADMIN — MAGNESIUM SULFATE IN WATER 2 G/HR: 40 INJECTION, SOLUTION INTRAVENOUS at 21:23

## 2018-07-07 RX ADMIN — MAGNESIUM SULFATE IN WATER 4 G: 40 INJECTION, SOLUTION INTRAVENOUS at 20:52

## 2018-07-07 RX ADMIN — SODIUM CHLORIDE, SODIUM LACTATE, POTASSIUM CHLORIDE, AND CALCIUM CHLORIDE 125 ML/HR: 600; 310; 30; 20 INJECTION, SOLUTION INTRAVENOUS at 21:19

## 2018-07-07 RX ADMIN — SODIUM CHLORIDE, SODIUM LACTATE, POTASSIUM CHLORIDE, AND CALCIUM CHLORIDE 1000 ML: 600; 310; 30; 20 INJECTION, SOLUTION INTRAVENOUS at 20:22

## 2018-07-07 NOTE — PROGRESS NOTES
1956 pt arrived with complaint of bright red vaginal bleeding upon wiping and small amount on kena pad, pt reports she also passed two silver dollar clots at 1800 today. Also reports of having some bright red bleeding at 1600 today. Pt states she was here on Thursday for same complaint , received betamethasone and saw mfm, was discharged yesterday. Pt has felt fetal movement today. 2015 Dr Nikita Estevez at nurses station, discussed assessment and pt's above complaints of vaginal bleeding and passing clots, aware pt was discharged yesterday for same event after betamethasone. Dr Nikita Estevez reviewed efm tracing and orders received 2033 Dr Nikita Estevez in and assessing pt, sve performed 3/75/ballotable, bulging bag, small amount of bright red vaginal bleeding. gbs performed 2046 bedside sbar report given to love figueroa rn

## 2018-07-07 NOTE — IP AVS SNAPSHOT
[address] [facility] 
 
 
 [address] [facility] 
[phone] Patient: Lakesha Jim MRN: [id number] A check radha indicates which time of day the medication should be taken. My Medications START taking these medications Instructions Each Dose to Equal  
 Morning Noon Evening Bedtime  
 ibuprofen 800 mg tablet Commonly known as:  MOTRIN Your last dose was: Your next dose is: Take 1 Tab by mouth every eight (8) hours. 800 mg  
    
   
   
   
  
 oxyCODONE-acetaminophen 5-325 mg per tablet Commonly known as:  PERCOCET Your last dose was: Your next dose is: Take 1-2 Tabs by mouth every four (4) hours as needed for Pain. Max Daily Amount: 12 Tabs. 1-2 Tab ASK your doctor about these medications Instructions Each Dose to Equal  
 Morning Noon Evening Bedtime  
 amitriptyline 25 mg tablet Commonly known as:  ELAVIL Your last dose was: Your next dose is: Take 1 Tab by mouth nightly. 25 mg  
    
   
   
   
  
 amoxicillin 500 mg capsule Commonly known as:  AMOXIL Your last dose was: Your next dose is: Take 500 mg by mouth. 500 mg  
    
   
   
   
  
 glucose blood VI test strips strip Commonly known as:  ONETOUCH ULTRA TEST Your last dose was: Your next dose is: For use with Verio Meter  
     
   
   
   
  
 lancets 30 gauge Misc Commonly known as:  Mahin Polio LANCETS Your last dose was: Your next dose is: For use with One Touch glucose meter * levothyroxine 75 mcg tablet Commonly known as:  SYNTHROID Your last dose was: Your next dose is: Take 1 tablet by mouth once daily before breakfast  
     
   
   
   
  
 * SYNTHROID 88 mcg tablet Generic drug:  levothyroxine Your last dose was: Your next dose is:    
   
   
      
   
   
   
  
 PNV No12-Iron-FA-DSS-OM-3 29 mg iron-1 mg -50 mg Cpkd Your last dose was: Your next dose is: Take  by mouth. SUMAtriptan 25 mg tablet Commonly known as:  IMITREX Your last dose was: Your next dose is: Take 1 Tab by mouth once as needed for Migraine for up to 1 dose. 25 mg  
    
   
   
   
  
 ZyrTEC 10 mg Cap Generic drug:  Cetirizine Your last dose was: Your next dose is: Take  by mouth. * Notice: This list has 2 medication(s) that are the same as other medications prescribed for you. Read the directions carefully, and ask your doctor or other care provider to review them with you. Where to Get Your Medications These medications were sent to [facility] .[address], [place] [id number] [facility], [address] [place] [address] Phone:  [phone]  
  ibuprofen 800 mg tablet Information on where to get these meds will be given to you by the nurse or doctor. ! Ask your nurse or doctor about these medications  
  oxyCODONE-acetaminophen 5-325 mg per tablet

## 2018-07-07 NOTE — IP AVS SNAPSHOT
[address] [facility] 
 
 
 380 [facility] 1007 [facility] 
[phone] Patient: Amanda Cuellar MRN: [id number] URR: About your hospitalization You were admitted on:  2018 You last received care in the:  [facility] 2 LABOR & DELIVERY You were discharged on:  2018 Why you were hospitalized Your primary diagnosis was:  Not on File Your diagnoses also included:   Labor In Second Trimester Follow-up Information Follow up With Details Comments Contact Info Storm Wu, LISA   53 [facility] 1007 [facility] 
[phone] Gopal Iraheta MD In 6 weeks  [address] [address] 1007 [facility] 
[phone] Discharge Orders None A check radha indicates which time of day the medication should be taken. My Medications START taking these medications Instructions Each Dose to Equal  
 Morning Noon Evening Bedtime  
 ibuprofen 800 mg tablet Commonly known as:  MOTRIN Your last dose was: Your next dose is: Take 1 Tab by mouth every eight (8) hours. 800 mg  
    
   
   
   
  
 oxyCODONE-acetaminophen 5-325 mg per tablet Commonly known as:  PERCOCET Your last dose was: Your next dose is: Take 1-2 Tabs by mouth every four (4) hours as needed for Pain. Max Daily Amount: 12 Tabs. 1-2 Tab ASK your doctor about these medications Instructions Each Dose to Equal  
 Morning Noon Evening Bedtime  
 amitriptyline 25 mg tablet Commonly known as:  ELAVIL Your last dose was: Your next dose is: Take 1 Tab by mouth nightly. 25 mg  
    
   
   
   
  
 amoxicillin 500 mg capsule Commonly known as:  AMOXIL Your last dose was: Your next dose is: Take 500 mg by mouth.   
 500 mg  
    
   
   
   
  
 glucose blood VI test strips strip Commonly known as:  ONETOUCH ULTRA TEST Your last dose was: Your next dose is: For use with Verio Meter  
     
   
   
   
  
 lancets 30 gauge Misc Commonly known as:  Rima Boyers LANCETS Your last dose was: Your next dose is: For use with One Touch glucose meter * levothyroxine 75 mcg tablet Commonly known as:  SYNTHROID Your last dose was: Your next dose is: Take 1 tablet by mouth once daily before breakfast  
     
   
   
   
  
 * SYNTHROID 88 mcg tablet Generic drug:  levothyroxine Your last dose was: Your next dose is:    
   
   
      
   
   
   
  
 PNV No12-Iron-FA-DSS-OM-3 29 mg iron-1 mg -50 mg Cpkd Your last dose was: Your next dose is: Take  by mouth. SUMAtriptan 25 mg tablet Commonly known as:  IMITREX Your last dose was: Your next dose is: Take 1 Tab by mouth once as needed for Migraine for up to 1 dose. 25 mg  
    
   
   
   
  
 ZyrTEC 10 mg Cap Generic drug:  Cetirizine Your last dose was: Your next dose is: Take  by mouth. * Notice: This list has 2 medication(s) that are the same as other medications prescribed for you. Read the directions carefully, and ask your doctor or other care provider to review them with you. Where to Get Your Medications These medications were sent to [address]  [place]  [address] [place] [address], [address] [place] [address] Phone:  [phone]  
  ibuprofen 800 mg tablet Information on where to get these meds will be given to you by the nurse or doctor. ! Ask your nurse or doctor about these medications  
  oxyCODONE-acetaminophen 5-325 mg per tablet Opioid Education Prescription Opioids: What You Need to Know: 
 
Prescription opioids can be used to help relieve moderate-to-severe pain and are often prescribed following a surgery or injury, or for certain health conditions. These medications can be an important part of treatment but also come with serious risks. Opioids are strong pain medicines. Examples include hydrocodone, oxycodone, fentanyl, and morphine. Heroin is an example of an illegal opioid. It is important to work with your health care provider to make sure you are getting the safest, most effective care. WHAT ARE THE RISKS AND SIDE EFFECTS OF OPIOID USE? Prescription opioids carry serious risks of addiction and overdose, especially with prolonged use. An opioid overdose, often marked by slow breathing, can cause sudden death. The use of prescription opioids can have a number of side effects as well, even when taken as directed. · Tolerance-meaning you might need to take more of a medication for the same pain relief · Physical dependence-meaning you have symptoms of withdrawal when the medication is stopped. Withdrawal symptoms can include nausea, sweating, chills, diarrhea, stomach cramps, and muscle aches. Withdrawal can last up to several weeks, depending on which drug you took and how long you took it. · Increased sensitivity to pain · Constipation · Nausea, vomiting, and dry mouth · Sleepiness and dizziness · Confusion · Depression · Low levels of testosterone that can result in lower sex drive, energy, and strength · Itching and sweating RISKS ARE GREATER WITH:      
· History of drug misuse, substance use disorder, or overdose · Mental health conditions (such as depression or anxiety) · Sleep apnea · Older age (72 years or older) · Pregnancy Avoid alcohol while taking prescription opioids. Also, unless specifically advised by your health care provider, medications to avoid include: · Benzodiazepines (such as Xanax or Valium) · Muscle relaxants (such as Soma or Flexeril) · Hypnotics (such as Ambien or Lunesta) · Other prescription opioids KNOW YOUR OPTIONS Talk to your health care provider about ways to manage your pain that don't involve prescription opioids. Some of these options may actually work better and have fewer risks and side effects. Options may include: 
· Pain relievers such as acetaminophen, ibuprofen, and naproxen · Some medications that are also used for depression or seizures · Physical therapy and exercise · Counseling to help patients learn how to cope better with triggers of pain and stress. · Application of heat or cold compress · Massage therapy · Relaxation techniques Be Informed Make sure you know the name of your medication, how much and how often to take it, and its potential risks & side effects. IF YOU ARE PRESCRIBED OPIOIDS FOR PAIN: 
· Never take opioids in greater amounts or more often than prescribed. Remember the goal is not to be pain-free but to manage your pain at a tolerable level. · Follow up with your primary care provider to: · Work together to create a plan on how to manage your pain. · Talk about ways to help manage your pain that don't involve prescription opioids. · Talk about any and all concerns and side effects. · Help prevent misuse and abuse. · Never sell or share prescription opioids · Help prevent misuse and abuse. · Store prescription opioids in a secure place and out of reach of others (this may include visitors, children, friends, and family). · Safely dispose of unused/unwanted prescription opioids: Find your community drug take-back program or your pharmacy mail-back program, or flush them down the toilet, following guidance from the Food and Drug Administration (www.fda.gov/Drugs/ResourcesForYou). · Visit www.cdc.gov/drugoverdose to learn about the risks of opioid abuse and overdose. · If you believe you may be struggling with addiction, tell your health care provider and ask for guidance or call Devendra Faria at 7-518-006-WNLJ. Discharge Instructions Discharge Instructions for Vaginal Delivery Take Home Medications Continue taking your prenatal vitamins if you are breastfeeding. Follow-up care is a key part of your treatment and safety. Please schedule and keep appointments. Follow-up with your primary OB in 6 weeks. Activity Avoid anything in your vagina for 6 weeks (no intercourse, tampons, or douching). You may drive unless you are taking prescription pain medications. Climbing stairs and light lifting are okay. Please avoid excessive exercise, though walking is okay- you'll be tired! Diet Regular diet as tolerated. Be sure to drink plenty of fluids if you are breastfeeding. Wound care If you have stitches, continue to rinse with a squirt bottle of warm water each time you void for about 7-10 days. .  Your stitches will gradually dissolve over four to eight weeks. Sitz baths are also helpful to keep the wound clean, encourage healing, and to help with pain associated with the stitches or hemorrhoids. You can use either a sitz bath basin or a bathtub filled with 2-3\" inches of plain warm water. Soak for 10 minutes 3 times a day as tolerated. Pain Management 1. Over the counter medications such as Tylenol and ibuprofen (Motrin or Advil) are ideal.  These may be taken together, alternating doses. You may  take the maximum dose:  Motrin or Advil (generic ibuprofen), either 3 tablets every 6 hours or 4 tablets every 8 hours or Tylenol (acetominophen) 1000mg every 6 hours (equivalent to 2 extra strength Tylenol). 2. You may also have a precrescription for stronger pain medication.   Take only as needed and transition to over the counter medication in the next few days. Minimize amounts of the prescription medication, as it can be habit-forming and will worsen or cause constipation. Most patients will find that within a couple of days, their pain is adequately controlled using only over-the-counter medications. 3. The prescription pain medication is mixed with Tylenol, therefore, you should not take any extra Tylenol or acetaminophen until you have reduced your prescription pain medication. 4. Add heating pad or sitz baths as needed. Add hemorrhoid wipes or ointments if needed Constipation 1. Constipation is normal after pregnancy and delivery, especially while taking prescription narcotic pain medication. 2. Over the counter remedies including ducosate (Colace), take 1-2 capsules 1-2 times daily for soft stool as needed. You may also add/ try milk of magnesia or rectal remedies such as Dulcolax or Fleets enema. Recovery: What to Expect at [facility] 1. Fatigue is expected. Try to rest when you can and don't worry about doing housework or other tasks which can wait. 2. The soreness along your bottom will improve significantly over the first 2 weeks, but it may take 6 weeks before you are completely recovered. 3. Back pain or general body aches or muscle soreness are expected and should improve with acetominophen or ibuprofen. 4. Leg swelling due to pregnancy and/or IV fluids given in the hospital will take about two weeks to resolve. 5. Most women experience some form of the \"Baby Blues\" after having a baby. Feeling emotional, tearful, frustrated, anxious, sad, and irritable some of the time is normal and go away after about 2 weeks. Adequate rest and help from your family will help. Take breaks from caring for the baby. Call your doctor if your symptoms seem severe, last more than 2 weeks, or seem to be getting worse instead of better. Get help immediately if you have thoughts of wanting to hurt yourself or others! Call your doctor or seek immediate medical care if you have: 
Heavy vaginal bleeding, soaking through one or more pads an hour for several hours. Foul-smelling discharge from your vagina or incision. Consistent nausea and vomiting and cannot keep fluids down. Consistent pain that does not get better after you take pain medicine. Sudden chest pain and shortness of breath Signs of a blood clot: pain/ swelling/ increasing redness in your lower extremeties Signs of infection: increased pain in your abdomen or vaginal area; red streaks, warmth, or tenderness of your breasts; fever of 100.5 F or greater [email or web address] Announcement We are excited to announce that we are making your provider's discharge notes available to you in [email or web address]. You will see these notes when they are completed and signed by the physician that discharged you from your recent hospital stay. If you have any questions or concerns about any information you see in [email or web address], please call the Health Information Department where you were seen or reach out to your Primary Care Provider for more information about your plan of care. Introducing [facility]! Dear Elan Muse: Thank you for requesting a [email or web address] account. Our records indicate that you already have an active [email or web address] account. You can access your account anytime at [email or web address] Did you know that you can access your hospital and ER discharge instructions at any time in [email or web address]? You can also review all of your test results from your hospital stay or ER visit. Additional Information If you have questions, please visit the Frequently Asked Questions section of the [email or web address] website at [email or web address] Remember, [email or web address] is NOT to be used for urgent needs. For medical emergencies, dial 911. Now available from your iPhone and Android! Introducing Jorge A Johnson As a [facility] patient, I wanted to make you aware of our electronic visit tool called Jorge A Johnson. [employer] Embassy allows you to connect within minutes with a medical provider 24 hours a day, seven days a week via a mobile device or tablet or logging into a secure website from your computer. You can access Jorge A Barretofin from anywhere in the [place]. A virtual visit might be right for you when you have a simple condition and feel like you just dont want to get out of bed, or cant get away from work for an appointment, when your regular [facility] provider is not available (evenings, weekends or holidays), or when youre out of town and need minor care. Electronic visits cost only $49 and if the [employer] [place] provider determines a prescription is needed to treat your condition, one can be electronically transmitted to a nearby pharmacy*. Please take a moment to enroll today if you have not already done so. The enrollment process is free and takes just a few minutes. To enroll, please download the Brand Embassy truman to your tablet or phone, or visit [email or web address] to enroll on your computer. And, as an [address] [place] [address] patient with a doxIQ account, the results of your visits will be scanned into your electronic medical record and your primary care provider will be able to view the scanned results. We urge you to continue to see your regular [facility] provider for your ongoing medical care. And while your primary care provider may not be the one available when you seek a Jorge A Diazjadynfin virtual visit, the peace of mind you get from getting a real diagnosis real time can be priceless. For more information on Jorge A Joesowmya, view our Frequently Asked Questions (FAQs) at [email or web address]. Sincerely, 
 
Zack Rocha MD 
Chief Medical Officer Deepika Negrete *:  certain medications cannot be prescribed via Jorge A Johnson Providers Seen During Your Hospitalization Provider Specialty Primary office phone Piter Camp MD Obstetrics & Gynecology [phone] Your Primary Care Physician (PCP) Primary Care Physician Office Phone Office Fax David Caro [phone] You are allergic to the following No active allergies Recent Documentation Height Weight Breastfeeding? BMI OB Status Smoking Status 1.486 m 62.1 kg Unknown 28.13 kg/m2 Recent pregnancy Never Smoker Emergency Contacts Name Discharge Info Relation Home Work Mobile Kristan Laguerre CAREGIVER [3] Boyfriend [17] [phone] Patient Belongings The following personal items are in your possession at time of discharge: 
  Dental Appliances: None  Visual Aid: None      Home Medications: None   Jewelry: Body Piercing, With patient  Clothing: At bedside    Other Valuables: At bedside Please provide this summary of care documentation to your next provider. Signatures-by signing, you are acknowledging that this After Visit Summary has been reviewed with you and you have received a copy. Patient Signature:  ____________________________________________________________ Date:  ____________________________________________________________  
  
Aloma Snellen Provider Signature:  ____________________________________________________________ Date:  ____________________________________________________________

## 2018-07-08 LAB
GLUCOSE BLD STRIP.AUTO-MCNC: 111 MG/DL (ref 65–100)
GLUCOSE BLD STRIP.AUTO-MCNC: 84 MG/DL (ref 65–100)
GLUCOSE BLD STRIP.AUTO-MCNC: 90 MG/DL (ref 65–100)
GLUCOSE BLD STRIP.AUTO-MCNC: 98 MG/DL (ref 65–100)
SERVICE CMNT-IMP: ABNORMAL
SERVICE CMNT-IMP: NORMAL

## 2018-07-08 PROCEDURE — 74011250637 HC RX REV CODE- 250/637: Performed by: OBSTETRICS & GYNECOLOGY

## 2018-07-08 PROCEDURE — 74011250636 HC RX REV CODE- 250/636: Performed by: OBSTETRICS & GYNECOLOGY

## 2018-07-08 PROCEDURE — 65270000029 HC RM PRIVATE

## 2018-07-08 PROCEDURE — 82962 GLUCOSE BLOOD TEST: CPT

## 2018-07-08 RX ORDER — CETIRIZINE HCL 10 MG
10 TABLET ORAL DAILY
Status: DISCONTINUED | OUTPATIENT
Start: 2018-07-08 | End: 2018-07-13 | Stop reason: HOSPADM

## 2018-07-08 RX ORDER — ONDANSETRON 4 MG/1
4 TABLET, ORALLY DISINTEGRATING ORAL
Status: DISCONTINUED | OUTPATIENT
Start: 2018-07-08 | End: 2018-07-11

## 2018-07-08 RX ADMIN — ACETAMINOPHEN 650 MG: 325 TABLET ORAL at 13:48

## 2018-07-08 RX ADMIN — ACETAMINOPHEN 650 MG: 325 TABLET ORAL at 04:56

## 2018-07-08 RX ADMIN — ONDANSETRON 4 MG: 4 TABLET, ORALLY DISINTEGRATING ORAL at 08:59

## 2018-07-08 RX ADMIN — ACETAMINOPHEN 650 MG: 325 TABLET ORAL at 09:22

## 2018-07-08 RX ADMIN — CETIRIZINE HYDROCHLORIDE 10 MG: 10 TABLET, FILM COATED ORAL at 09:22

## 2018-07-08 RX ADMIN — MAGNESIUM SULFATE IN WATER 2 G/HR: 40 INJECTION, SOLUTION INTRAVENOUS at 01:32

## 2018-07-08 RX ADMIN — MAGNESIUM SULFATE IN WATER 2 G/HR: 40 INJECTION, SOLUTION INTRAVENOUS at 06:27

## 2018-07-08 RX ADMIN — ACETAMINOPHEN 650 MG: 325 TABLET ORAL at 01:33

## 2018-07-08 RX ADMIN — SODIUM CHLORIDE, SODIUM LACTATE, POTASSIUM CHLORIDE, AND CALCIUM CHLORIDE 500 ML: 600; 310; 30; 20 INJECTION, SOLUTION INTRAVENOUS at 15:54

## 2018-07-08 RX ADMIN — LEVOTHYROXINE SODIUM 88 MCG: 88 TABLET ORAL at 06:19

## 2018-07-08 NOTE — PROGRESS NOTES
High Risk Obstetrics Progress Note    Name: Jarrell Sainz MRN: [id number]  SSN: [id number]    YOB: 1980  Age: 45 y.o. Sex: female      Subjective:      LOS: 1 day    Estimated Date of Delivery: 18   Gestational Age Today: 32w3d     Patient admitted for  labor. States she does not have abdominal pain  , contractions, fever, pelvic pressure, shortness of breath, vaginal bleeding  and vaginal leaking of fluid . Objective:     Vitals:  Blood pressure 92/59, pulse 70, temperature 97.8 °F (36.6 °C), resp. rate 16, height 4' 10.5\" (1.486 m), weight 62.1 kg (137 lb), SpO2 99 %, not currently breastfeeding. Temp (24hrs), Av.8 °F (36.6 °C), Min:97.7 °F (36.5 °C), Max:98 °F (24.7 °C)    Systolic (21LBX), JHN:691 , Min:85 , XEC:967      Diastolic (71UEK), BARRY:76, Min:54, Max:77       Intake and Output:       Date 18 0700 - 18 0659   Shift 2440-2034 4347-2680 4158-7132 24 Hour Total   I  N  T  A  K  E   Shift Total  (mL/kg)       O  U  T  P  U  T   Urine  (mL/kg/hr) 450   450    Emesis/NG output 150   150    Shift Total  (mL/kg) 600  (9.7)   600  (9.7)   Weight (kg) 62.1 62.1 62.1 62.1       Physical Exam:  Patient without distress.   Heart: Regular rate and rhythm  Lung: clear to auscultation throughout lung fields and normal respiratory effort  Abdomen: soft, nontender  Perineum: blood absent, amniotic fluid absent  Cervical Exam: deferred       Membranes:  intact    Uterine Activity:  None    Fetal Heart Rate:  Cat I tracing without decels no ctx noted this am        Labs:   Recent Results (from the past 36 hour(s))   CBC WITH AUTOMATED DIFF    Collection Time: 18  8:34 PM   Result Value Ref Range    WBC 10.1 3.6 - 11.0 K/uL    RBC 4.08 3.80 - 5.20 M/uL    HGB 9.6 (L) 11.5 - 16.0 g/dL    HCT 32.1 (L) 35.0 - 47.0 %    MCV 78.7 (L) 80.0 - 99.0 FL    MCH 23.5 (L) 26.0 - 34.0 PG    MCHC 29.9 (L) 30.0 - 36.5 g/dL    RDW 17.0 (H) 11.5 - 14.5 %    PLATELET 400 909 - 820 K/uL MPV 10.8 8.9 - 12.9 FL    NRBC 0.0 0  WBC    ABSOLUTE NRBC 0.00 0.00 - 0.01 K/uL    NEUTROPHILS 77 (H) 32 - 75 %    LYMPHOCYTES 14 12 - 49 %    MONOCYTES 8 5 - 13 %    EOSINOPHILS 0 0 - 7 %    BASOPHILS 0 0 - 1 %    IMMATURE GRANULOCYTES 1 (H) 0.0 - 0.5 %    ABS. NEUTROPHILS 7.8 1.8 - 8.0 K/UL    ABS. LYMPHOCYTES 1.4 0.8 - 3.5 K/UL    ABS. MONOCYTES 0.8 0.0 - 1.0 K/UL    ABS. EOSINOPHILS 0.0 0.0 - 0.4 K/UL    ABS. BASOPHILS 0.0 0.0 - 0.1 K/UL    ABS. IMM.  GRANS. 0.1 (H) 0.00 - 0.04 K/UL    DF AUTOMATED     URINALYSIS W/ REFLEX CULTURE    Collection Time: 07/07/18  9:54 PM   Result Value Ref Range    Color YELLOW/STRAW      Appearance CLEAR CLEAR      Specific gravity <1.005 1.003 - 1.030    pH (UA) 6.5 5.0 - 8.0      Protein NEGATIVE  NEG mg/dL    Glucose NEGATIVE  NEG mg/dL    Ketone 15 (A) NEG mg/dL    Bilirubin NEGATIVE  NEG      Blood MODERATE (A) NEG      Urobilinogen 0.2 0.2 - 1.0 EU/dL    Nitrites NEGATIVE  NEG      Leukocyte Esterase NEGATIVE  NEG      WBC 0-4 0 - 4 /hpf    RBC 0-5 0 - 5 /hpf    Epithelial cells FEW FEW /lpf    Bacteria NEGATIVE  NEG /hpf    UA:UC IF INDICATED CULTURE NOT INDICATED BY UA RESULT CNI     GLUCOSE, POC    Collection Time: 07/07/18  9:59 PM   Result Value Ref Range    Glucose (POC) 81 65 - 100 mg/dL    Performed by [id number], POC    Collection Time: 07/08/18  1:20 AM   Result Value Ref Range    Glucose (POC) 98 65 - 100 mg/dL    Performed by VAZQUEZ Negrete    GLUCOSE, POC    Collection Time: 07/08/18  6:22 AM   Result Value Ref Range    Glucose (POC) 90 65 - 100 mg/dL    Performed by VAZQUEZ Negrete        Assessment and Plan:      PTL in third trimester  Stable s/p MagSo4  Continue POC     Signed By: Rubio Willoughby MD     July 8, 2018

## 2018-07-08 NOTE — PROGRESS NOTES
1945 pt and SO at bedside watching TV, pt denies any needs at this time. Pt BS resulted 84  2100- pt resting, SO at bedside, denies any needs at this time. 0119 pt resting L lateral, pt did not stir upon RN entering room.

## 2018-07-08 NOTE — PROGRESS NOTES
4910 - SBAR from FRANKLYN Lynch RN. Assumed care. 3636 - Rounding to determine patient needs. Pt c/o of stuffiness and METCALF. Will ask MD for Zyrtec order. Pt confirms + FM and denies ctx at this time. No new episodes of bleeding reported.  Cold pack to neck per pt request.

## 2018-07-08 NOTE — PROGRESS NOTES
2045:  Bedside shift report received from MARIE Joseph RN. RN assumes care of patient at this time. 2145:  RN assisted patient to bedside commode. She voided 450 mL clear yellow urine, no blood noted on pad or in hat. 
 
2304:  RN assisted patient to bedside commode. She voided 450 mL bloody urine, however, no blood noted on pad at this time. 0020: Bedside and Verbal shift change report given to EDENILSON Marquez (oncoming nurse) by Chad Miller RN (offgoing nurse). Report included the following information SBAR, Kardex, Intake/Output, MAR, Accordion and Recent Results.

## 2018-07-08 NOTE — PROGRESS NOTES
07/08/18    8:40 AM  Bedside and Verbal shift change report given to Veronica Burrows RN (oncoming nurse) by Alexi Luoie RN (offgoing nurse). Report given with SBAR, Kardex, Intake/Output, MAR, Recent Results and Med Rec Status. Assumed care of pt. Introduced self as Primary RN. Bed locked and in low position with call bell within reach. Plan discussed with pt and understanding was verbalized. Pt denies additional complaints at this time. White board updated with this nurse's name. Patient advised that medical information will be discussed and it is their own reasonability to tell nurse if such conversation should not take place in the presence of visitors. Pt verbalizes understanding. Werner Thurston, RN    8:45 AM  Patient experiencing N/V and requesting something to assist with this and requesting her regular dosage of Zyrtec. Verbal orders received from Dr Nilsa Casillas. Verbal orders also received to discontinue Mag at 0930. Patient has not ate breakfast due to feeling sick and vomiting. No BS check performed. Patient given diet ginger ale. 9:58 AM  Patient up to bedside commode. Voided 100mL. New pad in place. Scant dark red blood noted on pad. No blood noted in urine. Urine is yellow straw and clear. 10:26 AM  Verbal order for patient to come off monitor. Verbal orders for NST BID.    1:54 PM  Patient  went to get lunch for patient. Patient informed to notify me once she has completed her meal.     3:00 PM  Patient reports feeling abdominal tightening occur twice over the last 2 hours be declines feeling any more. Informed patient to let RN know if she begins to have them more frequent. Patient has not had anything to eat besides a banana. No BS has been obtained. 3:20 PM  Patient ambulatory restroom without any issues. Reports very scant/faint amount of bleeding.      3:34 PM  Patient placed on EFM x 2    3:50 PM  Non reactive tracing, BG checked= 111, patient given apple juice due to not eating all day, 500mL LR Bolus started. Dr Jose Kumar aware. 4:40 PM  Spoke to Dr Jose Kumar, patient may shower. 5:29 PM  Patient up to shower    5:40 PM  Called to patient room for reports of bleeding and clot while wiping in the shower. Dark red blood noted. Small amount on wash cloth. Will notify Dr Jose Kumar. 5:49 PM  Dr Jose Kumar aware. No new orders received. 6:20 PM  Patient just completed dinner. BS will need to be drawn at [address] [place] [address]. Will relay to night shift RN.     6:56 PM  Bedside and Verbal shift change report given to Aleida Mcdonald RN (oncoming nurse) by Eden Carmen RN (offgoing nurse). Report included the following information SBAR, Kardex, Intake/Output, MAR, Recent Results and Med Rec Status.

## 2018-07-08 NOTE — PROGRESS NOTES
0020: SBAR report received from HESHAM Miller RN.    9579: RN at bedside to assess pt. Pt sitting up in bed, states she is just generally uncomfortable with everything attached to her. Pt states she can't get comfortable with all the monitors and cables and in the gown. Advised pt that RN needs to check her glucose 1 hr after she finished eating. Pt states she thinks she finished eating about 30 min. Ago. States she just ate a hamburger benitez and some french fries. 0118: RN at bedside. Pt states she is feeling so much anxiety and stress being hooked up to 'everything'. Pt asking to be disconnected from some of the monitors. States she feels like she cannot move her legs with the SCDs on. Feels like she cannot bend her arm with the BP cuff. States feels like the gown is 'drowning' her it is so big on her. Pt states all the monitors are causing her anxiety. Pt asking to remove SCDs for awhile so she can get some rest. Removed SCDs for now, advised pt will reapply them after a break. Pt asked to remove gown. Removed pt gown- pt is wearing a tank top and underware in bed. Pt states this does feel better and she is happy with this. 0124: Assisted pt out of bed to bedside commode. Moderate amount of bright red bleeding noted on peripad. 0127: Pt assisted back to bed. States she is very tired. Just needs to get at least 1-2 hrs of sleep. Repositioned pt to right lateral. Pt states she has a headache. States she feels like it is from all the anxiety she has about being here, about bleeding, and every time she changes her pad and sees blood it causes her head to hurt more. Pt's  states pt has not had a good solid meal in a while and feels like that may be causing her head to hurt. Will give pt. PO Tylenol as ordered. 0723: SBAR report given to [employer]

## 2018-07-08 NOTE — PROGRESS NOTES
6412:  This RN asked to watch over pt for temporary relief. FHR US adjusted. Pt in left lateral position resting quietly with eyes closed. Respirations even and unlabored at rate of 14.  0384-4020:  Purposeful round, Mag/Pre-E Assessment-  Pt up to [facility] voiding 350 cc. Dark red mucousy discharge noted when pt wipes after voiding-toilet tissue stained, not saturated. N/V with movement, emesis 150 cc  SCD's applied  Kena pad noted to have staining, dark red blood, scant amount; Clean kena-pad applied. 7626:  Report (as noted above) given to Dora Mccracken RN who is assuming care. Relinquishing care at this time.

## 2018-07-08 NOTE — H&P
History & Physical 
 
Name: Cherelle Cruz MRN: [id number]  SSN: [id number] YOB: 1980  Age: 45 y.o. Sex: female Subjective:  
 
Estimated Date of Delivery: 18 OB History  Para Term  AB Living 2 1 SAB TAB Ectopic Molar Multiple Live Births Ms. Chico Saalzar is admitted with pregnancy at 33w1d for vaginal bleeding upon wiping ~2hrs ago. She recently was discharged 2 days ago, status post hospitalization for PTL, given BMZ 18 and was seen by MFM. On tocometer, patient with uterine contractions every 5-7mins, mildly felt. Prenatal course complicated by AMA, GDMA1, thyroid dz. Please see prenatal records for details. Currently, no LOF; active fetal movement. Past Medical History:  
Diagnosis Date  Anemia  Gestational diabetes  Graves disease History of radiation treatment resulting in hypothyroidism  Hashimoto's thyroiditis  Thyroid disease Past Surgical History:  
Procedure Laterality Date  HX OTHER SURGICAL    
 radiation to thyroid gland, no longer has thyroid tissue present Social History Occupational History  Not on file. Social History Main Topics  Smoking status: Never Smoker  Smokeless tobacco: Never Used  Alcohol use No  
 Drug use: No  
 Sexual activity: Yes  
  Partners: Male Birth control/ protection: None Family History Problem Relation Age of Onset  Diabetes Mother  Hypertension Mother  Psoriasis Mother  Thyroid Disease Mother  Cancer Father No Known Allergies Prior to Admission medications Medication Sig Start Date End Date Taking? Authorizing Provider SYNTHROID 88 mcg tablet  18  Yes Historical Provider PNV No12-Iron-FA-DSS-OM-3 29 mg iron-1 mg -50 mg CPKD Take  by mouth. Yes Historical Provider Cetirizine (ZYRTEC) 10 mg cap Take  by mouth.    Yes Historical Provider  
glucose blood VI test strips (ONETOUCH ULTRA TEST) strip For use with Verio Meter 18  Yes Renetta Sanchez MD  
lancets [facility] DELICA LANCETS) 30 gauge misc For use with One Touch glucose meter 18  Yes Renetta Sanchez MD  
amoxicillin (AMOXIL) 500 mg capsule Take 500 mg by mouth. Historical Provider  
amitriptyline (ELAVIL) 25 mg tablet Take 1 Tab by mouth nightly. 3/1/16   Gutierrez Smith NP  
SUMAtriptan (IMITREX) 25 mg tablet Take 1 Tab by mouth once as needed for Migraine for up to 1 dose. 3/1/16   Gutierrez Smith NP  
levothyroxine (SYNTHROID) 75 mcg tablet Take 1 tablet by mouth once daily before breakfast 
Patient taking differently: Take 1 tablet by mouth once daily before breakfast, taking 88 mcg 10/12/14   Ana M Eduardo MD  
  
 
Pertinent items are noted in HPI. Objective:  
 
Vitals: 
Vitals:  
 18 BP:  110/70 Pulse:  62 Resp:  16 Temp:  97.9 °F (36.6 °C) SpO2:   98% 100% Weight: 62.1 kg (137 lb) Height: 4' 10.5\" (1.486 m) Physical Exam 
Gen: NAD Pulm: CTA B/L 
CV; S1S2 RRR Abd: Gravid, soft, NT 
Pelvic: presence of bloody mucous small amount, no active bleeding, Cervical Exam: 4 cm dilated, 75%effaced, bulging membranes, ballottable,-3 fetal station, fetus cephalic Uterine Activity: 5-7mins Membranes: Intact Fetal Heart Rate: Category 1 Prenatal Labs:  
Lab Results Component Value Date/Time  
 Rubella, External Immune 2018 HBsAg, External Negative 2018 HIV, External Negative 2018 Gonorrhea, External Negative 2018 Chlamydia, External Negative 2018 Impression/Plan: 33.1weeks gestation,  Labor Plan: Admit for  labor. Obtain GBS Culture. Begin Magnesium sulfate 4 gm bolus,followed by 2gm/ hr for 24 hrs. Discussion with patient and spouse, goal is to attempt to reduce contractions down enough to achieve at least 34 weeks+ if possible.  For GDMA1 status, blood glucose check FS and 1hr PPs. CEFM. Will start antibiotics prophylaxis with further cervical change. Will monitor closely. Signed By:  Ana Rosa Estrada MD   
 July 7, 2018

## 2018-07-09 LAB
GLUCOSE BLD STRIP.AUTO-MCNC: 78 MG/DL (ref 65–100)
GLUCOSE BLD STRIP.AUTO-MCNC: 82 MG/DL (ref 65–100)
GLUCOSE BLD STRIP.AUTO-MCNC: 88 MG/DL (ref 65–100)
SERVICE CMNT-IMP: NORMAL

## 2018-07-09 PROCEDURE — 76815 OB US LIMITED FETUS(S): CPT | Performed by: OBSTETRICS & GYNECOLOGY

## 2018-07-09 PROCEDURE — 74011250637 HC RX REV CODE- 250/637: Performed by: OBSTETRICS & GYNECOLOGY

## 2018-07-09 PROCEDURE — 76820 UMBILICAL ARTERY ECHO: CPT | Performed by: OBSTETRICS & GYNECOLOGY

## 2018-07-09 PROCEDURE — 76819 FETAL BIOPHYS PROFIL W/O NST: CPT | Performed by: OBSTETRICS & GYNECOLOGY

## 2018-07-09 PROCEDURE — 59025 FETAL NON-STRESS TEST: CPT

## 2018-07-09 PROCEDURE — 82962 GLUCOSE BLOOD TEST: CPT

## 2018-07-09 PROCEDURE — 65270000029 HC RM PRIVATE

## 2018-07-09 RX ORDER — DOCUSATE SODIUM 100 MG/1
100 CAPSULE, LIQUID FILLED ORAL
Status: DISCONTINUED | OUTPATIENT
Start: 2018-07-09 | End: 2018-07-11

## 2018-07-09 RX ADMIN — ACETAMINOPHEN 650 MG: 325 TABLET ORAL at 08:31

## 2018-07-09 RX ADMIN — LEVOTHYROXINE SODIUM 88 MCG: 88 TABLET ORAL at 07:22

## 2018-07-09 RX ADMIN — CETIRIZINE HYDROCHLORIDE 10 MG: 10 TABLET, FILM COATED ORAL at 08:31

## 2018-07-09 RX ADMIN — DOCUSATE SODIUM 100 MG: 100 CAPSULE ORAL at 09:18

## 2018-07-09 NOTE — PROGRESS NOTES
07/09/18 2:47 PM  Reason for Admission:   PTL/bleeding at 33w3d                   RRAT Score:  4                   Plan for utilizing home health:  None                        Likelihood of Readmission: Low/green--if discharged home, would be readmitted for delivery                         Transition of Care Plan: Patient to remain monitored on hospital until bleeding has resolved and cervix is stable, per MFM. Possible discharge home on Friday 7/13. Demographics reviewed and confirmed. Patient and her boyfriend/FOB Jose Nolan ([phone]) live with shayantent's sister; patient's sister has a [address] old and patient has an 6year old son. Patient works at a [employer] [place] and will have at least 6 weeks off from work. FOB works and will have a week off from work. Patient plans to breastfeed and does not yet have a breast pump, but would like to think about if she'd like to get one. Patient has car seat, crib, clothing, and other necessary supplies. [facility] Johnnie Gonzales at [facility] will provide medical follow up for the baby. Denied need for [facility] and Medicaid services at this time. CM will continue to follow. Care Management Interventions  PCP Verified by CM:  Yes  Mode of Transport at Discharge: Self  Transition of Care Consult (CM Consult): Discharge Planning  Current Support Network: Relative's Home, Lives with Spouse (MOB/FOB live with MOB's sister)  Confirm Follow Up Transport: Family  Plan discussed with Pt/Family/Caregiver: Yes  Discharge Location  Discharge Placement: Home with outpatient services  EDI Eaton

## 2018-07-09 NOTE — PROGRESS NOTES
Nutrition Assessment:    RECOMMENDATIONS/INTERVENTION(S):   1. Continue regular diet. 2. Continue to encourage PO intake and healthy wt gain. (target wt gain: at minimum 0.5lbs/wk; recommend 1.3lbs/wt in order to reach low end goal of 15 lbs total wt gain)     3. Will continue to monitor pt. ASSESSMENT:   : Received MST referral for GDM in patient admitted for  labor in second trimester. PMHx pertinent for graves disease and hashimoto's thyroiditis. Pt currently eating well with good appetite. No wt loss noted. Pt followed by DTC. Had appointment with CDE on . Pt stated good DM control at home and did not have any questions at this time. Pre-gestational wt 130lbs. Current wt 137lbs. Currently less than optimal wt gain for recommend 15-25 lbs for pre-gestational BMI. Pt stated she only gained 10 lbs for her first child. Discussed importance of wt gain for fetal development. Pt verbalized understanding. SUBJECTIVE/OBJECTIVE:   Diet Order: Regular  % Eaten:  No data found. Pertinent Medications: [x] Reviewed-colace, synthyroid  Past Medical History:   Diagnosis Date    Anemia     Gestational diabetes     diet controlled    Graves disease     History of radiation treatment resulting in hypothyroidism    Hashimoto's thyroiditis     Thyroid disease        Chemistries:  Lab Results   Component Value Date/Time    Sodium 135 (L) 2018 09:00 AM    Potassium 4.0 2018 09:00 AM    Chloride 104 2018 09:00 AM    CO2 23 2018 09:00 AM    Anion gap 8 2018 09:00 AM    Glucose 72 2018 09:00 AM    BUN 12 2018 09:00 AM    Creatinine 0.71 2018 09:00 AM    BUN/Creatinine ratio 17 2018 09:00 AM    GFR est AA >60 2018 09:00 AM    GFR est non-AA >60 2018 09:00 AM    Calcium 8.8 2018 09:00 AM    AST (SGOT) 24 2018 09:00 AM    Alk.  phosphatase 319 (H) 2018 09:00 AM    Protein, total 6.8 2018 09:00 AM    Albumin 2.5 (L) 07/05/2018 09:00 AM    Globulin 4.3 (H) 07/05/2018 09:00 AM    A-G Ratio 0.6 (L) 07/05/2018 09:00 AM    ALT (SGPT) 15 07/05/2018 09:00 AM      Anthropometrics: Height: 4' 10.5\" (148.6 cm) Weight: 62.1 kg (136 lb 14.5 oz)    IBW (%IBW):   ( ) UBW (%UBW): 59 kg (130 lb) (105.31 %)    BMI: Body mass index is 28.13 kg/(m^2). This BMI is indicative of:   [] Underweight    [] Normal    [x] Overweight (pre-pregnancy)    []  Obesity    []  Extreme Obesity (BMI>40)  Estimated Nutrition Needs (Based on):1817- 2113 Kcals/day (25-30kcal/kg +340kcal), 70 g (1.0g/kg +10g) Protein  Carbohydrate: At Least 130 g/day  Fluids: 2100 mL/day (1 ml/kcal)    Last BM: 7/7   []Active     []Hyperactive  []Hypoactive       [] Absent   BS  Skin:    [x] Intact   [] Incision  [] Breakdown   [] DTI   [] Tears/Excoriation/Abrasion  []Edema [] Other:    Wt Readings from Last 30 Encounters:   07/09/18 62.1 kg (136 lb 14.5 oz)   07/05/18 62.1 kg (137 lb)   07/05/18 62.1 kg (137 lb)   06/20/18 63 kg (139 lb)   06/06/18 62.1 kg (137 lb)   05/23/18 61.2 kg (135 lb)   04/23/18 62.1 kg (137 lb)   03/26/18 60.8 kg (134 lb)   02/26/18 59 kg (130 lb)   01/26/18 59 kg (130 lb)   03/01/16 63.5 kg (140 lb)   03/30/15 63.5 kg (140 lb)   03/23/15 63.5 kg (140 lb)   03/18/14 64.4 kg (142 lb)   01/27/14 64.4 kg (142 lb)   05/10/13 61.2 kg (135 lb)   12/27/12 58.5 kg (129 lb)   10/22/12 59 kg (130 lb)      NUTRITION DIAGNOSES:   Problem:  Inadequate energy intake     Etiology: related to increased kcal needs with pregnancy     Signs/Symptoms: as evidenced by Less than recommend wt gain at 33 wks      NUTRITION INTERVENTIONS:  Meals/Snacks: General/healthful diet         Initial/Brief Nutrition Education: Purpose of nutrition education (Education on nutrition needs with pregnancy)        GOAL:   Patient to consume % of meals in the next 5-7 days    Cultural, Methodist, or Ethnic Dietary Needs: None     EDUCATION & DISCHARGE NEEDS:    [] None Identified   [x] Identified and Education Provided/Documented   [] Identified and Pt declined/was not appropriate      [x] Interdisciplinary Care Plan Reviewed/Documented    [x] Discharge Needs: TBD, may need continued follow-up with DTC   [] No Nutrition Related Discharge Needs    NUTRITION RISK:   Pt Is At Nutrition Risk  [x]     No Nutrition Risk Identified  []       PT SEEN FOR:    []  MD Consult: []Calorie Count      []Diabetic Diet Education        []Diet Education     []Electrolyte Management     []General Nutrition Management and Supplements     []Management of Tube Feeding     []TPN Recommendations    [x]  RN Referral:  []MST score >=2     []Enteral/Parenteral Nutrition PTA     [x]Pregnant: Gestational DM or Multigestation                 [] Pressure Ulcer    []  Low BMI      []  Length of Stay       [] Dysphagia Diet         [] Ventilator  []  Follow-up     Previous Recommendations:   [] Implemented          [] Not Implemented          [x] Not Applicable    Previous Goal:   [] Met              [] Progressing Towards Goal              [] Not Progressing Towards Goal   [x] Not Applicable            Raymond Parks, [address] [place] [address]  Pager [phone]  Office [phone]

## 2018-07-09 NOTE — ROUTINE PROCESS
7682: Bedside, Verbal and Written shift change report given to MARIE Esqueda, RN (oncoming nurse) by LOUISE Escobar, TIFFANY (offgoing nurse). Report included the following information SBAR, Kardex, Intake/Output, MAR, Accordion and Recent Results. 8112: This RN updating Dr. Sarah Bernstein on pt status, this RN asking Dr. Sarah Bernstein if pt needs to be seen by [facility] today, MD stating that she is on her way over to assess pt cervix, MD will then determine plan of care for pt    0855: Dr. Sarah Bernstein at pt bedside at this time to assess pt. MD updated by this RN on pt scant bleeding overnight/this morning and pt report of \"tiny, thin dark clot\" this morning when she wiped. No orders received at this time    0857: SVE per Dr. Priyanka Ott, unchanged cervix of 3-4 noted. MD VORB pt to see M today and pt can have colace BID PRN. MD to determine plan of care following [facility] appt today    1100: Pt brought to [facility] by this RN via wheelchair with pt spouse    [phone]: Patient back in pt room at this time. Pt discussing possible plan of care with this RN    : This RN updating Dr. Sarah Bernstein on pt status and M recommendation per pt at this time, MD verbalizing understanding and no new orders received at this time    [phone]: Pt informing this RN that she just finished her lunch, this RN will take 1 hour post prandial blood glucose at approximately 1732    1637: Dr. Carlos Perkins pt may have wheelchair privileges to go outside to Qonf with spouse    1905: Bedside, Verbal and Written shift change report given to GUSTAVO Reyes (oncoming nurse) by Tacho Chavez, TIFFANY (offgoing nurse). Report included the following information SBAR, Kardex, Intake/Output, MAR, Accordion and Recent Results.

## 2018-07-09 NOTE — PROGRESS NOTES
M - Please see the Ultrasound report / consult note to be entered into this patient's record as a scanned document from my office. A text copy of this note is also provided below for convenience. Jaimee Ridley M.D., Ph.D.  Rosibel Carrillo    ---------------------------------------------------------------------------    Indication: Antepartum hemorrhage, unspecified, third trimester O49.80; small-for-gestational age; gestational diabetes; advanced maternal age.   ____________________________________________________________________________  History: Age: 45 years. Maternal age at [facility]: 45 years. : 2 Para: 1. Previous pregnancies: Children born at term: 1. Living children: 1. Current Pregnancy: Blood group: O Rhesus D-positive. Pre- pregnancy data: Weight 130 lbs. Height 4 ft 10 ins. BMI 27.2. Obstetric History: Mode of last delivery: Vaginal Delivery. Details on previous pregnancies: Living children >=37W: 1.  ____________________________________________________________________________  Dating:  Best Overall Assessment: 18 EDC: 18 Assessed GA: 33w3d  The Best Overall Assessment is based on an earlier assessment on 18 (GA 10w0d). ____________________________________________________________________________  General Evaluation:  Fetal heart activity: present. Fetal heart rate: 150 bpm.   Presentation: cephalic. Fetal movement: visible. Amniotic Fluid: normal. TOAN  15.7 cm. Maximal vertical pocket 4.9 cm. Placenta: anterior. Structure: normal.     ____________________________________________________________________________  Anatomy Scan:  Wayne gestation. Fetal Anatomy:  Visualized with normal appearance: gastrointestinal tract, kidneys, bladder. Summary of Ultrasound Findings:  Transabdominal US. U/S machine: Promuc E8 Expert.    Impression: Fetal anatomy appears normal.    ____________________________________________________________________________  Fetal Wellbeing Assessment:  Amniotic fluid: normal. TOAN: 15.7 cm. MVP: 4.9 cm. Q1: 4.3 cm. Q2: 3.7 cm. Q3: 4.9 cm. Q4: 2.8 cm. Biophysical Profile: Fetal body movements: normal (2), Fetal tone: normal (2), Fetal breathing movements: normal (2), Amniotic fluid volume: normal (2). Score 8 / 8. Summary: Reassuring fetal status. ____________________________________________________________________________  Doppler:  Fetal Doppler:  Umbilical Artery: PS 54.9 cm/s    ED 17.57 cm/s    EDF present   S/D ratio 2.33     RI 0.57     PI 0.84     TAMX 26.59 cm/s   Impression: Normal umbilical artery Doppler blood flow indices and waveforms. U/S Machine: [email or web address] E8 Expert.  ____________________________________________________________________________  Consultation:  Type of Consultation: Time-based patient evaluation:  25 minutes  Consultant: Dr. Rony Ramirez I spent more than half of this 25-minute visit at your request providing direct face-to-face counseling for this patient. Ms. Tal Francis is a 45 y.o.  at 33w3d admitted for advanced cervical dilation (4 cm / 80% / -3) and continued red-colored light vaginal bleeding. She has gestational diabetes, Class A-1 and hypothyroidism. She has received  steroids. MFM ultrasound shows martinez cephalic fetus, anterior placenta, no evidence of abruption, normal fluid (TOAN 15.7 cm), BPP 8 out of 8 points. Ultrasound on 18 (30w5d) showed small-for-gestational age (concerning for fetal growth restriction). Assessment:  1. IUP 33w3d  2. Advanced cervical dilation  3. Vaginal bleeding - unclear etiology  4. Gestational diabetes  5. Fetus small-for-gestational age - concerning for fetal growth restriction  6.  Reassuring maternal-fetal assessment    Recommendations:  1.   Continue hospitalization based on two active indications:  advanced cervical dilation and red vaginal bleeding. 2.  Reassess for possible discharge at 34 weeks. If reassuring maternal-fetal status and vaginal bleeding is resolved and cervix is stable at 4 cm or less, consider outpatient management with close follow-up and low threshold for rehospitalization. If unable to discharge at 34 weeks, consider re-evaluation for discharge on weekly basis or as clinically appropriate. 3.  Recommend no repeat course of steroids prior to delivery. 4.  Recommend no additional tocolysis. 5. If SROM occurs, manage as clinically appropriate with latency antibiotics and delivery planning for 34 weeks. 6.  Gestational diabetes management based on diet to achieve fasting < 90-95 mg/dL, 1-hr post-prandial < 130 mg/dL, and 2-hr post-prandial < 120 mg/dL. 7.  At this time,  delivery planning is NOT indicated, but  birth may be necessary if there is non-reassuring maternal-fetal assessment, heavy vaginal bleeding, or labor. 8.  At least daily NST while inpatient. 9.  Continue weekly BPPs with umbilical artery Doppler until delivery. 10.  Fetal growth evaluation in ~1 week. Thank you, very much, for this opportunity to provide consultation for your patient. If you have any questions or concerns, please do not hesitate to contact our office at your convenience.   ____________________________________________________________________________  Report Summary:  Impression: A limited study was performed for fetal evaluation. A biophysical profile and umbilical artery Doppler is performed to evaluate fetal well-being. Ms. Airam Anand is admitted for advanced cervical dilation (4-cm dilated) and vaginal bleeding. She is AMA and has gestational diabetes and hypothyroidism. Ultrasound at 30 weeks noted small-for-gestational age fetus (EFW 10%ile). She has received  steroids.   Her diabetes appears to be well-controlled with diet, although her glycemia was been increased in the last week due to  steroid administration. Her vaginal bleeding started less and one week ago and continues to be mild with red bleeding, but no significant pain. Her cervical dilation appears to be stable, but has also gradually increased over the past week. She has a personal history of thyroid disease which includes both Graves disease prior to her last pregnancy and Hashimoto's thyroiditis after that pregnancy. Her thyroid disease required two radioiodine ablation treatments for complete ablation and is currently managed with levothyroxine (88 mcg qday) by Dr. Tegan Smith. She has low-risk fetal cell-free DNA screening for fetal Trisomy 21/18/13 and negative testing for 22q11 microdeletion. She declined invasive diagnostic testing for fetal aneuploidy in this pregnancy. Her pregnancy history includes an uncomplicated term vaginal delivery (). She has no complaints at this time. Single viable IUP with composite biometry consistent with dates is observed. EFW is appropriate for gestational age. The placenta is anterior and appears normal.  There is no evidence of in utero hemorrhage or placental abruption. Fetal anatomy was not reviewed in detail, but appears normal as indicated above. Normal fetal movements and amniotic fluid measurements are noted. Umbilical artery Doppler is normal.  Forward end-diastolic flow is present. Fetal assessment is reassuring. See BPP score above. Recommendations: 1. Continue hospitalization at this time. 2.  Recommend no repeat course of steroids or prior to delivery. 3.  Gestational diabetes management based on diet to achieve fasting < 90-95 mg/dL, 1-hr post-prandial < 130 mg/dL, and 2-hr post-prandial < 120 mg/dL. 4.  At this time,  delivery planning is NOT indicated, but  birth may be necessary if there is non-reassuring maternal-fetal assessment, heavy vaginal bleeding, or labor.   5.  At least daily NST while inpatient. 6.  Continue weekly BPPs with umbilical artery Doppler until delivery. 7.  Fetal growth evaluation in ~1 week. 8.  Please see the above consultation note for additional comments and recommendations. ____________________________________________________________________________  Fetal Growth Overview:  Date GA BPD [mm] HC [mm] AC [mm] FL [mm] HUM [mm] EFW GP  02/14/18 12 + 5 ... ... ... ... ... ... . ..  04/09/18 20 + 3 45.2 20th 173.3 19th 154.2 50th 26.7 <5th 25.9 <5th 297g , 0 lbs 10 ozs n/a%  06/20/18 30 + 5 75.7 28th 283.8 25th 231.2 <5th 46.6 <5th . .. 1055g , 2 lbs 5 ozs 10th%%  07/05/18 32 + 6 ... ... ... ... ... ... . ..  07/09/18 33 + 3 ... ... ... ... ... ... . ..

## 2018-07-09 NOTE — PROGRESS NOTES
AntePartum High Risk Pregnancy Note    Jarrell Sainz  [id number]    Patient admitted for vaginal bleeding and and PCD 33w3d Estimated Date of Delivery: 18 states she does not  have  uterine contractions, LOF, or other compliants at this time. Her vaginal bleeding has signficantly decreased. .    Vitals:  Patient Vitals for the past 24 hrs:   BP Temp Pulse Resp SpO2   18 0925 - - - - 100 %   18 0915 - - - - 100 %   18 0910 - - - - 100 %   18 0905 - - - - 99 %   18 0900 - - - - 100 %   18 0855 - - - - 100 %   18 0850 - - - - 100 %   18 0845 - - - - 100 %   18 0840 - - - - 100 %   18 0835 - - - - 100 %   18 0713 108/63 98.3 °F (36.8 °C) 63 14 -   18 1946 93/55 98.3 °F (36.8 °C) 72 18 -   18 1121 - - - - 98 %   18 1116 - - - - 100 %   18 1111 - - - - 100 %   18 1106 - - - - 99 %   18 1101 101/70 - 70 16 97 %   18 1056 - - - - 97 %   18 1051 - - - - 98 %   18 1046 - - - - 98 %   18 1041 - - - - 99 %   18 1036 - - - - 98 %   18 1031 - - - - 99 %   18 1026 - - - - 100 %   18 1021 - - - - 100 %   18 1016 - - - - 100 %     Temp (24hrs), Av.3 °F (36.8 °C), Min:98.3 °F (36.8 °C), Max:98.3 °F (36.8 °C)    I&O:                  1901 -  0700  In: 1657.9 [I.V.:1657.9]  Out: 2626 [Urine:2475]    Exam:  Patient without distress.                Abdomen soft, non-tender               Fundus soft and non tender               Right upper quadrant non-tender               Perineum No sign of blood or amniotic fluid               Dilation: 4 cm     Effacement: 80%    Station: -3     Uterine Activity: None               NST:  Reactive, baseline 150, moderate variability, + accels, no decels, no contractions, monitoroed > 30  minutes             Labs:   Recent Results (from the past 24 hour(s))   GLUCOSE, POC    Collection Time: 18 3:49 PM   Result Value Ref Range    Glucose (POC) 111 (H) 65 - 100 mg/dL    Performed by Ai Portillo, POC    Collection Time: 07/08/18  7:48 PM   Result Value Ref Range    Glucose (POC) 84 65 - 100 mg/dL    Performed by Brant Sicard    GLUCOSE, POC    Collection Time: 07/09/18  7:15 AM   Result Value Ref Range    Glucose (POC) 78 65 - 100 mg/dL    Performed by Nick Gaviria Dr, POC    Collection Time: 07/09/18  9:20 AM   Result Value Ref Range    Glucose (POC) 88 65 - 100 mg/dL    Performed by Katherin Maldonado        Assessment and Plan:    IUP at 33w3 d  1. PCD and VB- s/p BMZ x 2 and Magnesium Sulfate. No signficant cervical change from last week however continues to have vaginal bleeding (although improved this morning). Discussed hospitalization with bedrest until 34 weeks vs bedrest at home. Will send to [facility] for additional recs. 2. GDM- diet controlled, continue qid BS and diabetic diet.

## 2018-07-10 LAB
GLUCOSE BLD STRIP.AUTO-MCNC: 100 MG/DL (ref 65–100)
GLUCOSE BLD STRIP.AUTO-MCNC: 78 MG/DL (ref 65–100)
GLUCOSE BLD STRIP.AUTO-MCNC: 97 MG/DL (ref 65–100)
SERVICE CMNT-IMP: NORMAL

## 2018-07-10 PROCEDURE — 74011250637 HC RX REV CODE- 250/637: Performed by: OBSTETRICS & GYNECOLOGY

## 2018-07-10 PROCEDURE — 74011250636 HC RX REV CODE- 250/636: Performed by: OBSTETRICS & GYNECOLOGY

## 2018-07-10 PROCEDURE — 82962 GLUCOSE BLOOD TEST: CPT

## 2018-07-10 PROCEDURE — 65270000029 HC RM PRIVATE

## 2018-07-10 RX ORDER — SODIUM CHLORIDE, SODIUM LACTATE, POTASSIUM CHLORIDE, CALCIUM CHLORIDE 600; 310; 30; 20 MG/100ML; MG/100ML; MG/100ML; MG/100ML
125 INJECTION, SOLUTION INTRAVENOUS CONTINUOUS
Status: DISCONTINUED | OUTPATIENT
Start: 2018-07-10 | End: 2018-07-11

## 2018-07-10 RX ORDER — ZOLPIDEM TARTRATE 5 MG/1
5 TABLET ORAL
Status: DISCONTINUED | OUTPATIENT
Start: 2018-07-10 | End: 2018-07-11

## 2018-07-10 RX ORDER — SODIUM CHLORIDE, SODIUM LACTATE, POTASSIUM CHLORIDE, CALCIUM CHLORIDE 600; 310; 30; 20 MG/100ML; MG/100ML; MG/100ML; MG/100ML
1000 INJECTION, SOLUTION INTRAVENOUS ONCE
Status: COMPLETED | OUTPATIENT
Start: 2018-07-10 | End: 2018-07-10

## 2018-07-10 RX ORDER — CALCIUM CARBONATE 200(500)MG
400 TABLET,CHEWABLE ORAL
Status: DISCONTINUED | OUTPATIENT
Start: 2018-07-10 | End: 2018-07-11

## 2018-07-10 RX ADMIN — ANTACID TABLETS 400 MG: 500 TABLET, CHEWABLE ORAL at 22:57

## 2018-07-10 RX ADMIN — ACETAMINOPHEN 650 MG: 325 TABLET ORAL at 23:55

## 2018-07-10 RX ADMIN — LEVOTHYROXINE SODIUM 88 MCG: 88 TABLET ORAL at 06:25

## 2018-07-10 RX ADMIN — SODIUM CHLORIDE, SODIUM LACTATE, POTASSIUM CHLORIDE, AND CALCIUM CHLORIDE 1000 ML: 600; 310; 30; 20 INJECTION, SOLUTION INTRAVENOUS at 12:10

## 2018-07-10 RX ADMIN — SODIUM CHLORIDE, SODIUM LACTATE, POTASSIUM CHLORIDE, AND CALCIUM CHLORIDE 125 ML/HR: 600; 310; 30; 20 INJECTION, SOLUTION INTRAVENOUS at 17:03

## 2018-07-10 RX ADMIN — DOCUSATE SODIUM 100 MG: 100 CAPSULE ORAL at 05:36

## 2018-07-10 RX ADMIN — CETIRIZINE HYDROCHLORIDE 10 MG: 10 TABLET, FILM COATED ORAL at 10:07

## 2018-07-10 NOTE — PROGRESS NOTES
Ante Partum Progress Note    Burney Kanner  33w4d        Patient states she does not have abdominal pain  , contractions, vaginal leaking of fluid  and or vaginal bleeding    Vitals:  Visit Vitals    /78    Pulse 64    Temp 98.2 °F (36.8 °C)    Resp 16    Ht 4' 10.5\" (1.486 m)    Wt 136 lb 14.5 oz (62.1 kg)    LMP  (LMP Unknown)    SpO2 100%    Breastfeeding No    BMI 28.13 kg/m2     Temp (24hrs), Av.9 °F (36.6 °C), Min:97.6 °F (36.4 °C), Max:98.2 °F (36.8 °C)      Last 24hr Input/Output:  No intake or output data in the 24 hours ending 07/10/18 1120     Non stress test:  Baseline 150's, occassional variable decels at least one that appears late in timing. Uterine Activity: Irregular     Exam:  Patient without distress.      Abdomen, fundus soft non-tender     Extremities, no redness or tenderness               Additional Exam: Patient without distress, Abdomen soft, non-tender and Fundus soft and non tender    Labs:     Lab Results   Component Value Date/Time    WBC 10.1 2018 08:34 PM    WBC 7.4 2018 09:00 AM    WBC 8.2 2018 01:28 PM    WBC 4.8 2018 10:01 AM    WBC 6.0 2014 03:01 PM    HGB 9.6 (L) 2018 08:34 PM    HGB 10.8 (L) 2018 09:00 AM    HGB 10.7 (L) 2018 01:28 PM    HGB 11.5 2018 10:01 AM    HGB 10.1 (L) 2014 03:01 PM    HCT 32.1 (L) 2018 08:34 PM    HCT 36.1 2018 09:00 AM    HCT 34.9 2018 01:28 PM    HCT 36.4 2018 10:01 AM    HCT 32.7 (L) 2014 03:01 PM    PLATELET 291  08:34 PM    PLATELET 466  09:00 AM    PLATELET 847  01:28 PM    PLATELET 763  10:01 AM    PLATELET 568  03:01 PM    Hgb, External 11.5 2018    Hct, External 36.4 2018    Platelet cnt., External 348 2018       Recent Results (from the past 24 hour(s))   GLUCOSE, POC    Collection Time: 18  5:35 PM   Result Value Ref Range    Glucose (POC) 82 65 - 100 mg/dL    Performed by Aaron Daniels, POC    Collection Time: 07/10/18  5:28 AM   Result Value Ref Range    Glucose (POC) 78 65 - 100 mg/dL    Performed by Jai Hannon        Assessment/Plan : 33w4d   PCD- stable, s/p BMZ, will continue hospitalized bedrest until 34 weeks  GDM- conitnue qid BS checks and diabetic diet  IUGR- will continue to closely monitor, followed to [facility]  declerations-   Will place pt on her side and continue to closely monitor, if she continues to have late     decelerations then will proceed to delivery.

## 2018-07-10 NOTE — PROGRESS NOTES
Pt has been monitored all day and was not had a reactive NST. At times she has had small variable decelerations with spontaneous recovery. Discussed with Dr. Bryson Sterling ([facility]) will continue to monitor overnight. If still not reactive in the morning then will repeat BPP. If fetal monitoring worsens then will repeat BPP sooner.

## 2018-07-10 NOTE — PROGRESS NOTES
2145 Resting quietly in bed with family visiting. Patient has yet to eat dinner, aware she needs post prandial BS check 1 hr after eating. Patient has no complaints at this time. 2247 patient states she won't be eating dinner today as she had such a late lunch. Verbalized understanding of need for fasting BS check in AM  1:17 AM patient sleeping on right side in bed in no apparent distress  2:45 AM patient sleeping on left side in bed in no apparent distress  7:15 AM SBAR bedside report to [address] [place] [address].  Care of patient released at this time

## 2018-07-10 NOTE — PROGRESS NOTES
7:15 AM  Bedside and Verbal shift change report given to Layton Lake RN (oncoming nurse) by Ben Hart RN (offgoing nurse). Report included the following information SBAR, Kardex, Procedure Summary, Intake/Output, MAR, Recent Results and Med Rec Status. Assumed care of the pt.    10:14AM  Patient placed on the monitor for AM NST. Patient denies any bleeding or contraction pains (pt stating dark brown discharge when using the restroom). Will continue to monitor pt.     11:09AM  Patient to remain on continuous fetal monitoring as ordered by Dr Tim Ayala for [facility] NST. 1L LR IV bolus given to the pt as is ordered by Dr. Tim Ayala. Will continue to monitor pt.     12:10 PM  Patient to see MFM tomorrow AM for BPP for NonReactive NST. Will continue to monitor pt.     16:25PM  SCDs placed on the pt. Patient denies any bleeding or contractions. Patient remains on the monitor for continuous fetal heart monitoring. Will continue to monitor pt.     7:29 PM  Bedside and Verbal shift change report given to Brian Squires RN (oncoming nurse) by Layton Lake RN (offgoing nurse). Report included the following information SBAR, Kardex, Procedure Summary, Intake/Output, MAR, Recent Results and Med Rec Status. 7:16 PM  Bedside and Verbal shift change report given to Layton Lake RN (oncoming nurse) by Ben Hart RN (offgoing nurse). Report included the following information SBAR, Kardex, Procedure Summary, Intake/Output, MAR, Recent Results and Med Rec Status. Assumed care of the pt.

## 2018-07-11 ENCOUNTER — TELEPHONE (OUTPATIENT)
Dept: OBGYN CLINIC | Age: 38
End: 2018-07-11

## 2018-07-11 LAB
BACTERIA SPEC CULT: NORMAL
GLUCOSE BLD STRIP.AUTO-MCNC: 86 MG/DL (ref 65–100)
SERVICE CMNT-IMP: NORMAL
SERVICE CMNT-IMP: NORMAL

## 2018-07-11 PROCEDURE — 88307 TISSUE EXAM BY PATHOLOGIST: CPT | Performed by: OBSTETRICS & GYNECOLOGY

## 2018-07-11 PROCEDURE — 82962 GLUCOSE BLOOD TEST: CPT

## 2018-07-11 PROCEDURE — 10907ZC DRAINAGE OF AMNIOTIC FLUID, THERAPEUTIC FROM PRODUCTS OF CONCEPTION, VIA NATURAL OR ARTIFICIAL OPENING: ICD-10-PCS | Performed by: OBSTETRICS & GYNECOLOGY

## 2018-07-11 PROCEDURE — 65270000029 HC RM PRIVATE

## 2018-07-11 PROCEDURE — 74011250636 HC RX REV CODE- 250/636: Performed by: OBSTETRICS & GYNECOLOGY

## 2018-07-11 PROCEDURE — 77030018749 HC HK AMNIO DISP DERY -A

## 2018-07-11 PROCEDURE — 74011250637 HC RX REV CODE- 250/637: Performed by: OBSTETRICS & GYNECOLOGY

## 2018-07-11 RX ORDER — OXYCODONE AND ACETAMINOPHEN 5; 325 MG/1; MG/1
1-2 TABLET ORAL
Qty: 15 TAB | Refills: 0 | Status: SHIPPED | OUTPATIENT
Start: 2018-07-11 | End: 2020-01-24 | Stop reason: ALTCHOICE

## 2018-07-11 RX ORDER — OXYTOCIN IN 5 % DEXTROSE 30/500 ML
PLASTIC BAG, INJECTION (ML) INTRAVENOUS
Status: DISPENSED
Start: 2018-07-11 | End: 2018-07-11

## 2018-07-11 RX ORDER — NALOXONE HYDROCHLORIDE 0.4 MG/ML
0.4 INJECTION, SOLUTION INTRAMUSCULAR; INTRAVENOUS; SUBCUTANEOUS AS NEEDED
Status: DISCONTINUED | OUTPATIENT
Start: 2018-07-11 | End: 2018-07-13 | Stop reason: HOSPADM

## 2018-07-11 RX ORDER — OXYTOCIN IN 5 % DEXTROSE 30/500 ML
999 PLASTIC BAG, INJECTION (ML) INTRAVENOUS ONCE
Status: COMPLETED | OUTPATIENT
Start: 2018-07-11 | End: 2018-07-11

## 2018-07-11 RX ORDER — IBUPROFEN 800 MG/1
800 TABLET ORAL EVERY 8 HOURS
Qty: 30 TAB | Refills: 0 | Status: SHIPPED | OUTPATIENT
Start: 2018-07-11

## 2018-07-11 RX ORDER — LIDOCAINE HYDROCHLORIDE 10 MG/ML
INJECTION INFILTRATION; PERINEURAL
Status: DISPENSED
Start: 2018-07-11 | End: 2018-07-11

## 2018-07-11 RX ORDER — IBUPROFEN 800 MG/1
800 TABLET ORAL EVERY 8 HOURS
Status: DISCONTINUED | OUTPATIENT
Start: 2018-07-11 | End: 2018-07-13 | Stop reason: HOSPADM

## 2018-07-11 RX ORDER — ZOLPIDEM TARTRATE 5 MG/1
5 TABLET ORAL
Status: DISCONTINUED | OUTPATIENT
Start: 2018-07-11 | End: 2018-07-13 | Stop reason: HOSPADM

## 2018-07-11 RX ORDER — HYDROCORTISONE ACETATE PRAMOXINE HCL 2.5; 1 G/100G; G/100G
CREAM TOPICAL AS NEEDED
Status: DISCONTINUED | OUTPATIENT
Start: 2018-07-11 | End: 2018-07-13 | Stop reason: HOSPADM

## 2018-07-11 RX ORDER — OXYTOCIN/RINGER'S LACTATE 20/1000 ML
125-500 PLASTIC BAG, INJECTION (ML) INTRAVENOUS ONCE
Status: ACTIVE | OUTPATIENT
Start: 2018-07-11 | End: 2018-07-11

## 2018-07-11 RX ADMIN — IBUPROFEN 800 MG: 800 TABLET ORAL at 08:02

## 2018-07-11 RX ADMIN — SODIUM CHLORIDE, SODIUM LACTATE, POTASSIUM CHLORIDE, AND CALCIUM CHLORIDE 999 ML/HR: 600; 310; 30; 20 INJECTION, SOLUTION INTRAVENOUS at 00:32

## 2018-07-11 RX ADMIN — IBUPROFEN 800 MG: 800 TABLET ORAL at 19:16

## 2018-07-11 RX ADMIN — IBUPROFEN 800 MG: 800 TABLET ORAL at 01:54

## 2018-07-11 RX ADMIN — Medication 999 ML/HR: at 01:08

## 2018-07-11 RX ADMIN — LEVOTHYROXINE SODIUM 88 MCG: 88 TABLET ORAL at 08:02

## 2018-07-11 NOTE — PROGRESS NOTES
Labor Progress Note  Patient seen, fetal heart rate and contraction pattern evaluated, patient examined. Physical Exam:  Cervical Exam: 6 cm dilated , 90% effaced  Membranes:  Intact  Uterine Activity: Frequency: Every 4 minutes  Fetal Heart Rate: Reactive  Station -2    Assessment/Plan:  Reassuring fetal status, + cx change, imminent labor, continue monitoring.    Discussed with pt and family, agree

## 2018-07-11 NOTE — PROGRESS NOTES
7:06 AM  Bedside and Verbal shift change report given to Mike Bey RN (oncoming nurse) by Madeline Bernardo RN (offgoing nurse). Report included the following information SBAR, Kardex, Procedure Summary, Intake/Output, MAR, Recent Results and Med Rec Status. 13:00PM  Patient IV d/c'd. Patient is oob to the br. Will continue to monitor pt. 17:00PM  Hourly rounding completed and patient noted out of room to the NICU visiting with the baby for a few hours. Will continue to monitor pt.     7:16 PM  Bedside and Verbal shift change report given to Rickey Johnson RN (oncoming nurse) by Mike Bey RN (offgoing nurse). Report included the following information SBAR, Kardex, Procedure Summary, Intake/Output, MAR, Recent Results and Med Rec Status.

## 2018-07-11 NOTE — TELEPHONE ENCOUNTER
Call received at 4:03PM.      45year old patient 33w5d pregnant patient delivered today. Pharmacy calling about a prescription that was contraindicated if patient was pregnant.

## 2018-07-11 NOTE — DISCHARGE SUMMARY
Obstetrical Discharge Summary     Name: Terry Kennedy MRN: [id number]  SSN: [id number]    YOB: 1980  Age: 45 y.o. Sex: female      Admit Date: 2018    Discharge Date: 2018    Admitting Physician: Margaret Ruiz MD     Attending Physician:  Dionicio Cogan, MD     * Admission Diagnoses: PREGNANCY; labor in second trimester    * Discharge Diagnoses:   Information for the patient's :  Farnaz Yates, Male [[id number]]   Delivery of a 3 lb 5.8 oz (1.525 kg) male infant via Vaginal, Spontaneous Delivery on 2018 at 1:04 AM  by . Apgars were 8 and 9. Additional Diagnoses:   Hospital Problems as of 2018  Date Reviewed: 2018          Codes Class Noted - Resolved POA     labor in second trimester ICD-10-CM: O60.02  ICD-9-CM: 644.20  2018 - Present Unknown             Lab Results   Component Value Date/Time    Rubella, External Immune 2018    ABO,Rh O Positive 2018      Immunization History   Administered Date(s) Administered    Tdap 2018       * Procedures:     * Discharge Condition: stable    * Hospital Course: Normal hospital course following the delivery. * Disposition: home with office follow-up    Discharge Medications:   Current Discharge Medication List      START taking these medications    Details   ibuprofen (MOTRIN) 800 mg tablet Take 1 Tab by mouth every eight (8) hours. Qty: 30 Tab, Refills: 0    Associated Diagnoses: Postpartum pain      oxyCODONE-acetaminophen (PERCOCET) 5-325 mg per tablet Take 1-2 Tabs by mouth every four (4) hours as needed for Pain. Max Daily Amount: 12 Tabs. Qty: 15 Tab, Refills: 0    Associated Diagnoses: Postpartum pain             * Follow-up Care/Patient Instructions:   Activity: activity as tolerated  Diet: general  Wound Care: as directed    Follow-up Information     Follow up With Details Comments [address], NP   [place] Megan Allen MD In 6 weeks  [address] [address]  [phone]             Signed By:  Morgan Veliz MD     July 11, 2018

## 2018-07-11 NOTE — PROGRESS NOTES
2110 Patient sitting up eating dinner with FOB at bedside. 10:43 PM patient up to restroom, experiencing lower abdominal pain consistent with gas pains per patient, no contractions noted on monitor, encouraged patient to empty bladder. Patient had deli meat sub for dinner, encouraged positional changes. 10:54 PM patient states she was able to urinate, relieving some pressure in lower abdomen. RN remained at bedside to palpate stomach when patient experiencing pain, no change in belly tightness, no palpable contraction noted. Nothing noted on monitor. Agreed to try TUMS to see if relieved  11:09 PM reviewed strip and patient status/complaints with Dr. Shane Alba, no new orders received at this time. MD aware patient experiencing lower abdominal pain occasionally and of tums administration. RN not to check cervix at this time. 46 RN called Dr Shane Alba to come assess patient, called out for bleeding/gush of fluid. nitrazine positive, possibly due to blood. 0006 Dr. Downey Reveal 6/90-2 BOW intact on exam. Plan to admit to labor room and expect immanent delivery. 12:36 AM patient settled in room 208, desires epidural bolus to be started  12:47 AM patient continuing to leak bloody fluid  7:12 AM SBAR bedside report to [address]  Care of patient released at this time

## 2018-07-11 NOTE — L&D DELIVERY NOTE
Delivery Summary    Patient: Cherelle Cruz MRN: [id number]  SSN: [id number]    YOB: 1980  Age: 45 y.o. Sex: female        Labor Events:    Labor: Yes    Rupture Date: 2018    Rupture Time: 1:00 AM    Rupture Type AROM    Amniotic Fluid Volume:      Amniotic Fluid Description: Clear       Induction: None        Augmentation: None    Labor Complications: None     Additional Complications:        Cervical Ripening:              Delivery Events:  Episiotomy: None    Laceration(s): None      Repaired: None     Number of Repair Packets:      Suture Type and Size:         Estimated Blood Loss (ml): 250        Information for the patient's :  Chico Salazar, Male [[id number]]     Delivery Summary - Baby    Delivery Date: 2018   Delivery Time: 1:04 AM   Delivery Type: Vaginal, Spontaneous Delivery  Sex:  male  Gestational Age: 33w5d  Delivery Clinician:  Leny Durham  Living?: Living   Delivery Location: [facility] [address]           APGARS  One minute Five minutes Ten minutes   Skin Color: 1    1       Heart Rate: 2   2         Reflex Irritability: 2   2         Muscle Tone: 2   2       Respiration: 1   2         Total: 8   9           Presentation: Vertex  Position: Left Occiput Anterior  Resuscitation Method:  Suctioning-bulb; Tactile Stimulation;C-PAP     Meconium Stained: None    Cord Information: 3 Vessels   Complications: Nuchal Cord Without Compressions  Cord Blood Sent?:  Yes    Blood Gases Sent?:  No    Placenta:  Date/Time:   1:08 AM  Removal: Spontaneous      Appearance: Normal     [place] Measurements:  Birth Weight: 1.525 kg    Birth Length: 40.2 cm   Head Circumference: 29 cm     Chest Circumference: 24.2 cm    Abdominal Girth: 24 cm    Other Providers:   LARS ELLIS;BRIDGETT GOTTLIEB;CARLOS GREEN;FABIANA ENAMORADO;Ca GALLARDO DANEAL Obstetrician;Primary Nurse;Nicu Nurse;Charge Nurse;Nurse Practitioner;Tech           Cord Blood Results:  Information for the patient's :  Helena Mejia, Male [[id number]]     Lab Results   Component Value Date/Time    ABORH O POSITIVE 2018 02:48 AM    PCTDIG NEG 2018 02:48 AM    BILI IF DIRECT PAUL POSITIVE, BILIRUBIN TO FOLLOW 2018 02:48 AM     Information for the patient's :  Helena Mejia, Male [[id number]]   No results found for: APH, APCO2, APO2, AHCO3, ABEC, ABDC, O2ST, SITE, [place], PHI, [place], PO2I, HCO3I, SO2I, IBD     Information for the patient's :  Helena Mejia, Male [[id number]]   No results found for: EPHV, PCO2V, PO2V, HCO3V, O2STV, EBDV

## 2018-07-11 NOTE — LACTATION NOTE
This note was copied from a baby's chart. Mother resting in bed, delivered at 33 wks this morning,  labor. Mother states her plan is to BF but \"I don't know how that would work with the baby in NICU. \"  Reassured mother that she can BF with her baby in NICU. Reviewed pumping with mother and talked with mother about donor milk. Mother aware that she needs to contact insurance for breast pump and that she will need a pump upon discharge. Explained to mother she will need to rent a pump through the hospital or directly through [address] [place] [address]. Assisted mother with pumping session, hand expression also taught. Encouraged mother to give drops of colostrum to baby when visiting baby in NICU. Discussed with mother her plan for feeding. Reviewed the benefits of exclusive breast milk feeding during the hospital stay. Informed her of the risks of using formula to supplement in the first few days of life as well as the benefits of successful breast milk feeding; referred her to the Breastfeeding booklet about this information. She acknowledges understanding of information reviewed and states that it is her plan to breastfeed her infant. Will support her choice and offer additional information as needed. Hand Expression Education:  Mom taught how to manually hand express her colostrum. Emphasized the importance of providing infant with valuable colostrum as infant rests skin to skin at breast.  Aware to avoid extended periods of non-feeding. Aware to offer 10-20+ drops of colostrum every 2-3 hours until infant is latching and nursing effectively. Taught the rationale behind this low tech but highly effective evidence based practice. Pumping:  Guidelines for pumping, milk collection and storage, proper cleaning of pump parts all reviewed. How to establish and maintain breast milk supply through pumping reviewed.   Differences between hospital grade rental pumps vs store bought double electric/hand pumps discussed. Set up pumping with double electric set up. Assisted with pump session. List of area pump rental locations and lactation support services provided. Pt will successfully establish breastfeeding by feeding in response to early feeding cues   or wake every 3h, will obtain deep latch, and will keep log of feedings/output. Taught to BF at hunger cues and or q 2-3 hrs and to offer 10-20 drops of hand expressed colostrum at any non-feeds.       Breast Assessment  Left Breast: Large  Left Nipple: Everted, Intact  Right Breast: Large  Right Nipple: Everted, Intact  Breast- Feeding Assessment  Attends Breast-Feeding Classes: No  Breast-Feeding Experience: Yes (attempt with first child, difficulty with latching then prod)  Breast Trauma/Surgery: No  Lactation Consultant Visits  Breast-Feedings:  (baby in NICU at this time)

## 2018-07-11 NOTE — PROGRESS NOTES
07/11/18 11:33 AM  CM met with MOB today for follow up. MOB delivered baby early this morning and baby boy is currently in NICU. MOB understands CM role and had no questions currently.   EDI Lorenzana

## 2018-07-12 PROCEDURE — 74011250637 HC RX REV CODE- 250/637: Performed by: OBSTETRICS & GYNECOLOGY

## 2018-07-12 PROCEDURE — 65270000029 HC RM PRIVATE

## 2018-07-12 RX ADMIN — LEVOTHYROXINE SODIUM 88 MCG: 88 TABLET ORAL at 08:02

## 2018-07-12 RX ADMIN — IBUPROFEN 800 MG: 800 TABLET ORAL at 22:29

## 2018-07-12 RX ADMIN — IBUPROFEN 800 MG: 800 TABLET ORAL at 02:58

## 2018-07-12 RX ADMIN — IBUPROFEN 800 MG: 800 TABLET ORAL at 12:15

## 2018-07-12 RX ADMIN — CETIRIZINE HYDROCHLORIDE 10 MG: 10 TABLET, FILM COATED ORAL at 12:15

## 2018-07-12 NOTE — PROGRESS NOTES
2145- Patient ambulating in room; no complaints voiced at this time. S.O in room also, patient denies any pain. 2255- Patient ambulating to NICU.

## 2018-07-12 NOTE — PROGRESS NOTES
Post-Partum Day Number 1 Progress Note      Patient doing well post-partum without significant complaint. She is voiding without difficulty, she reports normal lochia. She is ambulatiing without dizziness. Her pain is well controlled with oral pain medication - occ motrin. She is tolerating general diet. Baby in NICU. Doing well. Able to latch on. Vitals:  Patient Vitals for the past 8 hrs:   BP Temp Pulse Resp   18 1519 106/74 97.9 °F (36.6 °C) 69 16     Temp (24hrs), Av.9 °F (36.6 °C), Min:97.7 °F (36.5 °C), Max:98.2 °F (36.8 °C)        Exam:  Patient without distress. Lungs: CTA bilaterally               CV: regular rate/rhythm, no rubs or gallops, no murmur               Abdomen soft, nontender, nondistended, normal bowel sounds               Uterus: fundus firm at level of umbilicus-1/-2, nontender               Lower extremities are negative for cords or tenderness, no swelling. Labs: No results found for this or any previous visit (from the past 24 hour(s)). Assessment and Plan:   Postpartum Day #1 S/P . Doing well.    - routine care   - anticipate discharge in AM

## 2018-07-12 NOTE — PROGRESS NOTES
Bedside and Verbal shift change report given to MONICA Berkowitz RN (oncoming nurse) by Rosalina Norris RN (offgoing nurse). Report included the following information SBAR, Kardex, Intake/Output, MAR and Recent Results.

## 2018-07-12 NOTE — PROGRESS NOTES
07/12/18    6:50 PM  Bedside shift change report given to Kamille Boyle (oncoming nurse) by Caridad Benjamin RN (offgoing nurse). Report given with SBAR, Kardex, Intake/Output, MAR, Recent Results and Med Rec Status. Assumed care of pt. Introduced self as Primary RN. Bed locked and in low position with call bell within reach. Plan discussed with pt and understanding was verbalized. Pt denies additional complaints at this time. White board updated with this nurse's name. Patient advised that medical information will be discussed and it is their own reasonability to tell nurse if such conversation should not take place in the presence of visitors. Pt verbalizes understanding. Heron Gonzalez RN    7:25 PM  Patient in the NICU at this time. 8:02 PM  Patient remains in the NICU. No Request at this time. 8:45 PM  Patient remains in the NICU. No request at this time. 9:50 PM  Patient remains in the NICU. No request at this time. 10:15 PM  Patient returned from the NICU at this time. 7:14 AM  Bedside and Verbal shift change report given to Reinaldo Haynes RN (oncoming nurse) by Todd Do RN (offgoing nurse). Report included the following information SBAR, Kardex, Intake/Output, MAR, Recent Results and Med Rec Status.

## 2018-07-12 NOTE — PROGRESS NOTES
1435-Bedside report received from JOSI Alamo in NICU pt is holding her infant. POC discussed with pt at this time, pt states she needs nothing and instructed to call once she gets back to her room so that writer can perform assessment/obtain VS.  1517-Pt called out and states she is back in room from the NICU. 1518-Writer at bedside to take VS and perform assessment on pt.  1621-Writer at bedside to check on pt status, pt states she needs nothing at this time. 1732-Pt in NICU holding her infant, states she needs nothing at this time.

## 2018-07-13 VITALS
HEIGHT: 59 IN | RESPIRATION RATE: 16 BRPM | OXYGEN SATURATION: 98 % | BODY MASS INDEX: 27.6 KG/M2 | SYSTOLIC BLOOD PRESSURE: 112 MMHG | HEART RATE: 73 BPM | DIASTOLIC BLOOD PRESSURE: 73 MMHG | WEIGHT: 136.91 LBS | TEMPERATURE: 98.1 F

## 2018-07-13 PROCEDURE — 74011250637 HC RX REV CODE- 250/637: Performed by: OBSTETRICS & GYNECOLOGY

## 2018-07-13 RX ADMIN — LEVOTHYROXINE SODIUM 88 MCG: 88 TABLET ORAL at 07:56

## 2018-07-13 RX ADMIN — CETIRIZINE HYDROCHLORIDE 10 MG: 10 TABLET, FILM COATED ORAL at 07:56

## 2018-07-13 RX ADMIN — IBUPROFEN 800 MG: 800 TABLET ORAL at 07:56

## 2018-07-13 NOTE — PROGRESS NOTES
Patient off unit in stable condition for discharge home per Dr. Myles Hayden. Patient is to follow-up in 6 weeks and is aware. Prescriptions given to patient. Patient denies any H/A, dizziness, N/V, or pain at this time. Infant in NICU.  Mother will visit and nurse infant in NICU prior to exiting hospital.

## 2018-07-13 NOTE — PROGRESS NOTES
Post-Partum Progress Note    Patient doing well post-partum without significant complaint. She is voiding without difficulty, she reports normal lochia. Her pain is well controlled with oral pain medication. She is tolerating a general diet. Delivery information:  Information for the patient's :  Michelle Valverde, Male [[id number]]   Delivery of a 3 lb 5.8 oz (1.525 kg) male infant via Vaginal, Spontaneous Delivery on 2018 at 1:04 AM  by . Apgars were 8 and 9. Vitals:  Patient Vitals for the past 8 hrs:   BP Temp Pulse Resp   18 0757 112/73 98.1 °F (36.7 °C) 73 16     Temp (24hrs), Av °F (36.7 °C), Min:97.9 °F (36.6 °C), Max:98.1 °F (36.7 °C)    Visit Vitals    /73 (BP 1 Location: Left arm, BP Patient Position: At rest)    Pulse 73    Temp 98.1 °F (36.7 °C)    Resp 16    Ht 4' 10.5\" (1.486 m)    Wt 136 lb 14.5 oz (62.1 kg)    SpO2 98%    Breastfeeding Unknown    BMI 28.13 kg/m2       Exam:    General: Patient without distress. Abdomen: soft, fundus firm at level of umbilicus, nontender  Lower extremities: negative for cords or tenderness. Labs: No results found for this or any previous visit (from the past 24 hour(s)). Assessment:    1. Postpartum S/P spontaneous vaginal delivery   2. Patient doing well without significant complications    Plan:  1. Continue routine postpartum care  2. Routine perineal care and maternal education   3.  Oral pain medications and bowel regimen as needed                Malick Baxter MD

## 2018-07-13 NOTE — PROGRESS NOTES
07/13/18 10:00 AM  CM spoke with MOB today for follow up, as MOB being discharged home and baby will remain in NICU. MOB has ordered a breast pump for delivery to her home and will rent from [address] [place] [address] for 1 week.   MOB reconfirmed availability of transportation to get to and from hospital.    EDI Galvan

## 2018-07-13 NOTE — DISCHARGE INSTRUCTIONS
Discharge Instructions for Vaginal Delivery      Take Home Medications       Continue taking your prenatal vitamins if you are breastfeeding. Follow-up care is a key part of your treatment and safety. Please schedule and keep appointments. Follow-up with your primary OB in 6 weeks. Activity  Avoid anything in your vagina for 6 weeks (no intercourse, tampons, or douching). You may drive unless you are taking prescription pain medications. Climbing stairs and light lifting are okay. Please avoid excessive exercise, though walking is okay- you'll be tired! Diet  Regular diet as tolerated. Be sure to drink plenty of fluids if you are breastfeeding. Wound care  If you have stitches, continue to rinse with a squirt bottle of warm water each time you void for about 7-10 days. .  Your stitches will gradually dissolve over four to eight weeks. Sitz baths are also helpful to keep the wound clean, encourage healing, and to help with pain associated with the stitches or hemorrhoids. You can use either a sitz bath basin or a bathtub filled with 2-3\" inches of plain warm water. Soak for 10 minutes 3 times a day as tolerated. Pain Management  1. Over the counter medications such as Tylenol and ibuprofen (Motrin or Advil) are ideal.  These may be taken together, alternating doses. You may  take the maximum dose:  Motrin or Advil (generic ibuprofen), either 3 tablets every 6 hours or 4 tablets every 8 hours or Tylenol (acetominophen) 1000mg every 6 hours (equivalent to 2 extra strength Tylenol). 2. You may also have a precrescription for stronger pain medication. Take only as needed and transition to over the counter medication in the next few days. Minimize amounts of the prescription medication, as it can be habit-forming and will worsen or cause constipation. Most patients will find that within a couple of days, their pain is adequately controlled using only over-the-counter medications.   3. The prescription pain medication is mixed with Tylenol, therefore, you should not take any extra Tylenol or acetaminophen until you have reduced your prescription pain medication. 4. Add heating pad or sitz baths as needed. Add hemorrhoid wipes or ointments if needed    Constipation  1. Constipation is normal after pregnancy and delivery, especially while taking prescription narcotic pain medication. 2. Over the counter remedies including ducosate (Colace), take 1-2 capsules 1-2 times daily for soft stool as needed. You may also add/ try milk of magnesia or rectal remedies such as Dulcolax or Fleets enema. Recovery: What to Expect at Home  1. Fatigue is expected. Try to rest when you can and don't worry about doing housework or other tasks which can wait. 2. The soreness along your bottom will improve significantly over the first 2 weeks, but it may take 6 weeks before you are completely recovered. 3. Back pain or general body aches or muscle soreness are expected and should improve with acetominophen or ibuprofen. 4. Leg swelling due to pregnancy and/or IV fluids given in the hospital will take about two weeks to resolve. 5. Most women experience some form of the \"Baby Blues\" after having a baby. Feeling emotional, tearful, frustrated, anxious, sad, and irritable some of the time is normal and go away after about 2 weeks. Adequate rest and help from your family will help. Take breaks from caring for the baby. Call your doctor if your symptoms seem severe, last more than 2 weeks, or seem to be getting worse instead of better. Get help immediately if you have thoughts of wanting to hurt yourself or others! Call your doctor or seek immediate medical care if you have:  Heavy vaginal bleeding, soaking through one or more pads an hour for several hours. Foul-smelling discharge from your vagina or incision. Consistent nausea and vomiting and cannot keep fluids down.   Consistent pain that does not get better after you take pain medicine.   Sudden chest pain and shortness of breath  Signs of a blood clot: pain/ swelling/ increasing redness in your lower extremeties  Signs of infection: increased pain in your abdomen or vaginal area; red streaks, warmth, or tenderness of your breasts; fever of 100.5 F or greater

## 2018-09-19 ENCOUNTER — OFFICE VISIT (OUTPATIENT)
Dept: OBGYN CLINIC | Age: 38
End: 2018-09-19

## 2018-09-19 VITALS
DIASTOLIC BLOOD PRESSURE: 64 MMHG | BODY MASS INDEX: 26.41 KG/M2 | SYSTOLIC BLOOD PRESSURE: 110 MMHG | HEIGHT: 59 IN | WEIGHT: 131 LBS

## 2018-09-19 DIAGNOSIS — Z01.419 WELL FEMALE EXAM WITH ROUTINE GYNECOLOGICAL EXAM: ICD-10-CM

## 2018-09-19 NOTE — PROGRESS NOTES
Postpartum evaluation    Zoe Hubbard is a 45 y.o. female who presents for a postpartum exam.     She is now six weeks post normal spontaneous vaginal delivery. Her baby has been in and out of the hospital due to reflux. She has had no menses since delivery. She has had the following significant problems since her delivery: none    The patient is not breast feeding. The patient would like to use nothing for birth control. She is currently taking: no medications. She is due for her next AE in 12 months.      Visit Vitals    /64    Ht 4' 10.5\" (1.486 m)    Wt 131 lb (59.4 kg)    Breastfeeding No    BMI 26.91 kg/m2       PHYSICAL EXAMINATION    Constitutional  · Appearance: well-nourished, well developed, alert, in no acute distress    HENT  · Head and Face: appears normal    Neck  · Inspection/Palpation: normal appearance, no masses or tenderness  · Lymph Nodes: no lymphadenopathy present  · Thyroid: gland size normal, nontender, no nodules or masses present on palpation    Gastrointestinal  · Abdominal Examination: abdomen non-tender to palpation, normal bowel sounds, no masses present  · Liver and spleen: no hepatomegaly present, spleen not palpable  · Hernias: no hernias identified    Genitourinary  · External Genitalia: normal appearance for age, no discharge present, no tenderness present, no inflammatory lesions present, no masses present, no atrophy present  · Vagina: normal vaginal vault without central or paravaginal defects, no discharge present, no inflammatory lesions present, no masses present  · Bladder: non-tender to palpation  · Urethra: appears normal  · Cervix: normal   · Uterus: normal size, shape and consistency  · Adnexa: no adnexal tenderness present, no adnexal masses present  · Perineum: perineum within normal limits, no evidence of trauma, no rashes or skin lesions present  · Anus: anus within normal limits, no hemorrhoids present  · Inguinal Lymph Nodes: no lymphadenopathy present    Skin  · General Inspection: no rash, no lesions identified    Neurologic/Psychiatric  · Mental Status:  · Orientation: grossly oriented to person, place and time  · Mood and Affect: mood normal, affect appropriate    Assessment:  Normal postpartum check    Plan:  RTO for AE.

## 2019-02-28 ENCOUNTER — DOCUMENTATION ONLY (OUTPATIENT)
Dept: OBGYN CLINIC | Age: 39
End: 2019-02-28

## 2020-01-24 ENCOUNTER — OFFICE VISIT (OUTPATIENT)
Dept: SURGERY | Age: 40
End: 2020-01-24

## 2020-01-24 VITALS
HEART RATE: 74 BPM | HEIGHT: 58 IN | BODY MASS INDEX: 27.08 KG/M2 | SYSTOLIC BLOOD PRESSURE: 120 MMHG | DIASTOLIC BLOOD PRESSURE: 74 MMHG | WEIGHT: 129 LBS

## 2020-01-24 DIAGNOSIS — N60.12 FIBROCYSTIC BREAST CHANGES, BILATERAL: Primary | ICD-10-CM

## 2020-01-24 DIAGNOSIS — N60.11 FIBROCYSTIC BREAST CHANGES, BILATERAL: Primary | ICD-10-CM

## 2020-01-24 RX ORDER — ERGOCALCIFEROL 1.25 MG/1
50000 CAPSULE ORAL
COMMUNITY
Start: 2020-01-22

## 2020-01-24 RX ORDER — GLUCOSAMINE SULFATE 1500 MG
POWDER IN PACKET (EA) ORAL DAILY
COMMUNITY

## 2020-01-24 NOTE — PROGRESS NOTES
HISTORY OF PRESENT ILLNESS  Diandra Cobian is a 44 y.o. female. HPI  NEW patient consult referred by  Dr. Anirudh Villarreal for BILATERAL breast lumps. Patient is able to palpate a RIGHT breast lump that she has had for years. Her doctor was able to palpate lumps in both breast.  Having pain but her menstrual cycle is about to start.  Denies nipple discharge/retraction.      Family History:  Denies FH of breast or ovarian cancer.      Mammogram/us , 2/23/2017, BIRADS 2       Past Medical History:   Diagnosis Date    Anemia     Gestational diabetes     diet controlled    Graves disease     History of radiation treatment resulting in hypothyroidism    Hashimoto's thyroiditis     Pap smear for cervical cancer screening 1/26/18 neg NO ECC HPV NEG    Thyroid disease        Past Surgical History:   Procedure Laterality Date    HX OTHER SURGICAL      radiation to thyroid gland, no longer has thyroid tissue present       Social History     Socioeconomic History    Marital status: SINGLE     Spouse name: Not on file    Number of children: Not on file    Years of education: Not on file    Highest education level: Not on file   Occupational History    Not on file   Social Needs    Financial resource strain: Not on file    Food insecurity:     Worry: Not on file     Inability: Not on file    Transportation needs:     Medical: Not on file     Non-medical: Not on file   Tobacco Use    Smoking status: Never Smoker    Smokeless tobacco: Never Used   Substance and Sexual Activity    Alcohol use: No    Drug use: No    Sexual activity: Yes     Partners: Male     Birth control/protection: None   Lifestyle    Physical activity:     Days per week: Not on file     Minutes per session: Not on file    Stress: Not on file   Relationships    Social connections:     Talks on phone: Not on file     Gets together: Not on file     Attends [employer] service: Not on file     Active member of club or organization: Not on file Attends meetings of clubs or organizations: Not on file     Relationship status: Not on file    Intimate partner violence:     Fear of current or ex partner: Not on file     Emotionally abused: Not on file     Physically abused: Not on file     Forced sexual activity: Not on file   Other Topics Concern    Not on file   Social History Narrative    Not on file       Current Outpatient Medications on File Prior to Visit   Medication Sig Dispense Refill    ergocalciferol (ERGOCALCIFEROL) 1,250 mcg (50,000 unit) capsule Take 50,000 Units by mouth every seven (7) days.  cholecalciferol (VITAMIN D3) 25 mcg (1,000 unit) cap Take  by mouth daily.  ibuprofen (MOTRIN) 800 mg tablet Take 1 Tab by mouth every eight (8) hours. 30 Tab 0    SYNTHROID 88 mcg tablet       levothyroxine (SYNTHROID) 75 mcg tablet Take 1 tablet by mouth once daily before breakfast (Patient taking differently: Take 1 tablet by mouth once daily before breakfast, taking 88 mcg) 30 tablet 4    [DISCONTINUED] oxyCODONE-acetaminophen (PERCOCET) 5-325 mg per tablet Take 1-2 Tabs by mouth every four (4) hours as needed for Pain. Max Daily Amount: 12 Tabs. 15 Tab 0    [DISCONTINUED] PNV No12-Iron-FA-DSS-OM-3 29 mg iron-1 mg -50 mg CPKD Take  by mouth.  [DISCONTINUED] Cetirizine (ZYRTEC) 10 mg cap Take  by mouth as needed for Other (allergies).  [DISCONTINUED] glucose blood VI test strips (ONETOUCH ULTRA TEST) strip For use with Verio Meter 120 Strip 11    [DISCONTINUED] lancets (ONETOUCH DELICA LANCETS) 30 gauge misc For use with One Touch glucose meter 120 Lancet 11    [DISCONTINUED] amoxicillin (AMOXIL) 500 mg capsule Take 500 mg by mouth.  [DISCONTINUED] amitriptyline (ELAVIL) 25 mg tablet Take 1 Tab by mouth nightly. 30 Tab 2    [DISCONTINUED] SUMAtriptan (IMITREX) 25 mg tablet Take 1 Tab by mouth once as needed for Migraine for up to 1 dose.  12 Tab 2     No current facility-administered medications on file prior to visit. No Known Allergies    OB History        2    Para   2    Term           1    AB        Living   1       SAB        TAB        Ectopic        Molar        Multiple   0    Live Births   1          Obstetric Comments   Menarche 6, LMP 2020, # of children 2, age of 4st delivery 32, Hysterectomy/oophorectomy no/no, Breast bx no, history of breast feeding yes, BCP no, Hormone therapy no             ROS  Cardiovascular: Positive for palpitations. Gastrointestinal: Positive for abdominal pain, blood in stool and constipation. Musculoskeletal: Positive for back pain and neck pain. Neurological: Positive for headaches. Endo/Heme/Allergies: Bruises/bleeds easily. All other systems reviewed and are negative. Physical Exam  Exam conducted with a chaperone present. Cardiovascular:      Rate and Rhythm: Normal rate and regular rhythm. Heart sounds: Normal heart sounds. Pulmonary:      Breath sounds: Normal breath sounds. Chest:      Breasts: Breasts are asymmetrical (R>L). Right: Normal. No swelling, bleeding, inverted nipple, mass, nipple discharge, skin change or tenderness. Left: Normal. No swelling, bleeding, inverted nipple, mass, nipple discharge, skin change or tenderness. Lymphadenopathy:      Cervical:      Right cervical: No superficial, deep or posterior cervical adenopathy. Left cervical: No superficial, deep or posterior cervical adenopathy. Upper Body:      Right upper body: No supraclavicular or axillary adenopathy. Left upper body: No supraclavicular or axillary adenopathy. BREAST ULTRASOUND  Indication: BILATERAL breast masslike thickening, LEFT breast nodularity  Technique:  The area was scanned using a high-frequency linear-array near-field transducer  Findings: Normal dense fibroglandular breast tissue bilaterally  Impression: Normal breast tissue  Disposition: No worrisome finding on ultrasound      ASSESSMENT and PLAN    ICD-10-CM ICD-9-CM    1. Fibrocystic breast changes, bilateral N60.11 610.1     N60.12        New patient presents for evaluation of BL breast lumps, and is doing well overall. Palpable masslike thickening on exam at RIGHT breast UOQ, and nodularity at LEFT breast 6:00. US visualizes normal dense fibroglandular breast tissue bilaterally. Discussed plan to continue observation, and pt agrees. F/U PRN. This plan was reviewed with the patient and patient agrees. All questions were answered.     Written by Ana Grossman, as dictated by Dr. Marybel Armijo MD.

## 2020-01-24 NOTE — PATIENT INSTRUCTIONS

## 2020-01-24 NOTE — LETTER
1/27/2020 12:54 PM 
 
Patient:  Damion Hudson YOB: 1980 Date of Visit: 1/24/2020 Dear Dr. Cadena Shoulder: 
 
 
Thank you for referring Ms. Sotero Mckay to me for evaluation/treatment. Below are the relevant portions of my assessment and plan of care. HISTORY OF PRESENT ILLNESS Damion Hudson is a 44 y.o. female. HPI 
NEW patient consult referred by  Dr. Gustavo Mccallum for BILATERAL breast lumps. Patient is able to palpate a RIGHT breast lump that she has had for years. Her doctor was able to palpate lumps in both breast.  Having pain but her menstrual cycle is about to start. Denies nipple discharge/retraction.  
  
Family History: 
Denies FH of breast or ovarian cancer.  
  
Mammogram/us , 2/23/2017, BIRADS 2 
  
 
Past Medical History:  
Diagnosis Date  Anemia  Gestational diabetes   
 diet controlled  Graves disease History of radiation treatment resulting in hypothyroidism  Hashimoto's thyroiditis  Pap smear for cervical cancer screening 1/26/18 neg NO ECC HPV NEG  Thyroid disease Past Surgical History:  
Procedure Laterality Date  HX OTHER SURGICAL    
 radiation to thyroid gland, no longer has thyroid tissue present Social History Socioeconomic History  Marital status: SINGLE Spouse name: Not on file  Number of children: Not on file  Years of education: Not on file  Highest education level: Not on file Occupational History  Not on file Social Needs  Financial resource strain: Not on file  Food insecurity:  
  Worry: Not on file Inability: Not on file  Transportation needs:  
  Medical: Not on file Non-medical: Not on file Tobacco Use  Smoking status: Never Smoker  Smokeless tobacco: Never Used Substance and Sexual Activity  Alcohol use: No  
 Drug use: No  
 Sexual activity: Yes  
  Partners: Male Birth control/protection: None Lifestyle  Physical activity: Days per week: Not on file Minutes per session: Not on file  Stress: Not on file Relationships  Social connections:  
  Talks on phone: Not on file Gets together: Not on file Attends Congregational service: Not on file Active member of club or organization: Not on file Attends meetings of clubs or organizations: Not on file Relationship status: Not on file  Intimate partner violence:  
  Fear of current or ex partner: Not on file Emotionally abused: Not on file Physically abused: Not on file Forced sexual activity: Not on file Other Topics Concern  Not on file Social History Narrative  Not on file Current Outpatient Medications on File Prior to Visit Medication Sig Dispense Refill  ergocalciferol (ERGOCALCIFEROL) 1,250 mcg (50,000 unit) capsule Take 50,000 Units by mouth every seven (7) days.  cholecalciferol (VITAMIN D3) 25 mcg (1,000 unit) cap Take  by mouth daily.  ibuprofen (MOTRIN) 800 mg tablet Take 1 Tab by mouth every eight (8) hours. 30 Tab 0  
 SYNTHROID 88 mcg tablet  levothyroxine (SYNTHROID) 75 mcg tablet Take 1 tablet by mouth once daily before breakfast (Patient taking differently: Take 1 tablet by mouth once daily before breakfast, taking 88 mcg) 30 tablet 4  
 [DISCONTINUED] oxyCODONE-acetaminophen (PERCOCET) 5-325 mg per tablet Take 1-2 Tabs by mouth every four (4) hours as needed for Pain. Max Daily Amount: 12 Tabs. 15 Tab 0  [DISCONTINUED] PNV No12-Iron-FA-DSS-OM-3 29 mg iron-1 mg -50 mg CPKD Take  by mouth.  [DISCONTINUED] Cetirizine (ZYRTEC) 10 mg cap Take  by mouth as needed for Other (allergies).  [DISCONTINUED] glucose blood VI test strips (ONETOUCH ULTRA TEST) strip For use with Verio Meter 120 Strip 11  
 [DISCONTINUED] lancets (ONETOUCH DELICA LANCETS) 30 gauge misc For use with One Touch glucose meter 120 Lancet 11  
 [DISCONTINUED] amoxicillin (AMOXIL) 500 mg capsule Take 500 mg by mouth.  [DISCONTINUED] amitriptyline (ELAVIL) 25 mg tablet Take 1 Tab by mouth nightly. 30 Tab 2  
 [DISCONTINUED] SUMAtriptan (IMITREX) 25 mg tablet Take 1 Tab by mouth once as needed for Migraine for up to 1 dose. 12 Tab 2 No current facility-administered medications on file prior to visit. No Known Allergies OB History Marynilda Kelley 2 Para 2 Term  1 AB Living  
1 SAB  
   
 TAB Ectopic Molar Multiple  
0 Live Births 1 Obstetric Comments Menarche 6, LMP 2020, # of children 2, age of 4st delivery 32, Hysterectomy/oophorectomy no/no, Breast bx no, history of breast feeding yes, BCP no, Hormone therapy no ROS Cardiovascular: Positive for palpitations. Gastrointestinal: Positive for abdominal pain, blood in stool and constipation. Musculoskeletal: Positive for back pain and neck pain. Neurological: Positive for headaches. Endo/Heme/Allergies: Bruises/bleeds easily. All other systems reviewed and are negative. Physical Exam 
Exam conducted with a chaperone present. Cardiovascular:  
   Rate and Rhythm: Normal rate and regular rhythm. Heart sounds: Normal heart sounds. Pulmonary:  
   Breath sounds: Normal breath sounds. Chest:  
   Breasts: Breasts are asymmetrical (R>L). Right: Normal. No swelling, bleeding, inverted nipple, mass, nipple discharge, skin change or tenderness. Left: Normal. No swelling, bleeding, inverted nipple, mass, nipple discharge, skin change or tenderness. Lymphadenopathy:  
   Cervical:  
   Right cervical: No superficial, deep or posterior cervical adenopathy. Left cervical: No superficial, deep or posterior cervical adenopathy. Upper Body:  
   Right upper body: No supraclavicular or axillary adenopathy. Left upper body: No supraclavicular or axillary adenopathy. BREAST ULTRASOUND Indication: BILATERAL breast masslike thickening, LEFT breast nodularity Technique: The area was scanned using a high-frequency linear-array near-field transducer Findings: Normal dense fibroglandular breast tissue bilaterally Impression: Normal breast tissue Disposition: No worrisome finding on ultrasound ASSESSMENT and PLAN 
  ICD-10-CM ICD-9-CM 1. Fibrocystic breast changes, bilateral N60.11 610.1 N60.12 New patient presents for evaluation of BL breast lumps, and is doing well overall. Palpable masslike thickening on exam at RIGHT breast UOQ, and nodularity at LEFT breast 6:00. US visualizes normal dense fibroglandular breast tissue bilaterally. Discussed plan to continue observation, and pt agrees. F/U PRN. This plan was reviewed with the patient and patient agrees. All questions were answered. Written by Rudolph Bowen, as dictated by Dr. Saurav Brito MD. 
 
If you have questions, please do not hesitate to call me. I look forward to following Ms. Michelle Ege along with you.  
 
 
 
Sincerely, 
 
 
Winnie Churchill MD

## 2020-01-24 NOTE — PROGRESS NOTES
HISTORY OF PRESENT ILLNESS Jose Mosquera is a 44 y.o. female. HPI  NEW patient consult referred by  Dr. Sarthak Porras for [facility] breast lumps. Patient is able to palpate a RIGHT breast lump that she has had for years. Her doctor was able to palpate lumps in both breast.  Having pain but her menstrual cycle is about to start. Denies nipple discharge/retraction. Family History: 
Denies FH of breast or ovarian cancer. Mammogram/us , 2/23/2017, BIRADS 2 Review of Systems Cardiovascular: Positive for palpitations. Gastrointestinal: Positive for abdominal pain, blood in stool and constipation. Musculoskeletal: Positive for back pain and neck pain. Neurological: Positive for headaches. Endo/Heme/Allergies: Bruises/bleeds easily. All other systems reviewed and are negative. Physical Exam 
 
ASSESSMENT and PLAN 
{ASSESSMENT/PLAN:[id number]}

## 2020-01-27 NOTE — COMMUNICATION BODY
HISTORY OF PRESENT ILLNESS  Diandra Schmitz is a 44 y.o. female. HPI  NEW patient consult referred by  Dr. Aurora Blood for BILATERAL breast lumps. Patient is able to palpate a RIGHT breast lump that she has had for years. Her doctor was able to palpate lumps in both breast.  Having pain but her menstrual cycle is about to start.  Denies nipple discharge/retraction.      Family History:  Denies FH of breast or ovarian cancer.      Mammogram/us , 2/23/2017, BIRADS 2       Past Medical History:   Diagnosis Date    Anemia     Gestational diabetes     diet controlled    Graves disease     History of radiation treatment resulting in hypothyroidism    Hashimoto's thyroiditis     Pap smear for cervical cancer screening 1/26/18 neg NO ECC HPV NEG    Thyroid disease        Past Surgical History:   Procedure Laterality Date    HX OTHER SURGICAL      radiation to thyroid gland, no longer has thyroid tissue present       Social History     Socioeconomic History    Marital status: SINGLE     Spouse name: Not on file    Number of children: Not on file    Years of education: Not on file    Highest education level: Not on file   Occupational History    Not on file   Social Needs    Financial resource strain: Not on file    Food insecurity:     Worry: Not on file     Inability: Not on file    Transportation needs:     Medical: Not on file     Non-medical: Not on file   Tobacco Use    Smoking status: Never Smoker    Smokeless tobacco: Never Used   Substance and Sexual Activity    Alcohol use: No    Drug use: No    Sexual activity: Yes     Partners: Male     Birth control/protection: None   Lifestyle    Physical activity:     Days per week: Not on file     Minutes per session: Not on file    Stress: Not on file   Relationships    Social connections:     Talks on phone: Not on file     Gets together: Not on file     Attends Confucianism service: Not on file     Active member of club or organization: Not on file Attends meetings of clubs or organizations: Not on file     Relationship status: Not on file    Intimate partner violence:     Fear of current or ex partner: Not on file     Emotionally abused: Not on file     Physically abused: Not on file     Forced sexual activity: Not on file   Other Topics Concern    Not on file   Social History Narrative    Not on file       Current Outpatient Medications on File Prior to Visit   Medication Sig Dispense Refill    ergocalciferol (ERGOCALCIFEROL) 1,250 mcg (50,000 unit) capsule Take 50,000 Units by mouth every seven (7) days.  cholecalciferol (VITAMIN D3) 25 mcg (1,000 unit) cap Take  by mouth daily.  ibuprofen (MOTRIN) 800 mg tablet Take 1 Tab by mouth every eight (8) hours. 30 Tab 0    SYNTHROID 88 mcg tablet       levothyroxine (SYNTHROID) 75 mcg tablet Take 1 tablet by mouth once daily before breakfast (Patient taking differently: Take 1 tablet by mouth once daily before breakfast, taking 88 mcg) 30 tablet 4    [DISCONTINUED] oxyCODONE-acetaminophen (PERCOCET) 5-325 mg per tablet Take 1-2 Tabs by mouth every four (4) hours as needed for Pain. Max Daily Amount: 12 Tabs. 15 Tab 0    [DISCONTINUED] PNV No12-Iron-FA-DSS-OM-3 29 mg iron-1 mg -50 mg CPKD Take  by mouth.  [DISCONTINUED] Cetirizine (ZYRTEC) 10 mg cap Take  by mouth as needed for Other (allergies).  [DISCONTINUED] glucose blood VI test strips (ONETOUCH ULTRA TEST) strip For use with Verio Meter 120 Strip 11    [DISCONTINUED] lancets (ONETOUCH DELICA LANCETS) 30 gauge misc For use with One Touch glucose meter 120 Lancet 11    [DISCONTINUED] amoxicillin (AMOXIL) 500 mg capsule Take 500 mg by mouth.  [DISCONTINUED] amitriptyline (ELAVIL) 25 mg tablet Take 1 Tab by mouth nightly. 30 Tab 2    [DISCONTINUED] SUMAtriptan (IMITREX) 25 mg tablet Take 1 Tab by mouth once as needed for Migraine for up to 1 dose.  12 Tab 2     No current facility-administered medications on file prior to visit. No Known Allergies    OB History        2    Para   2    Term           1    AB        Living   1       SAB        TAB        Ectopic        Molar        Multiple   0    Live Births   1          Obstetric Comments   Menarche 6, LMP 2020, # of children 2, age of 4st delivery 32, Hysterectomy/oophorectomy no/no, Breast bx no, history of breast feeding yes, BCP no, Hormone therapy no             ROS  Cardiovascular: Positive for palpitations. Gastrointestinal: Positive for abdominal pain, blood in stool and constipation. Musculoskeletal: Positive for back pain and neck pain. Neurological: Positive for headaches. Endo/Heme/Allergies: Bruises/bleeds easily. All other systems reviewed and are negative. Physical Exam  Exam conducted with a chaperone present. Cardiovascular:      Rate and Rhythm: Normal rate and regular rhythm. Heart sounds: Normal heart sounds. Pulmonary:      Breath sounds: Normal breath sounds. Chest:      Breasts: Breasts are asymmetrical (R>L). Right: Normal. No swelling, bleeding, inverted nipple, mass, nipple discharge, skin change or tenderness. Left: Normal. No swelling, bleeding, inverted nipple, mass, nipple discharge, skin change or tenderness. Lymphadenopathy:      Cervical:      Right cervical: No superficial, deep or posterior cervical adenopathy. Left cervical: No superficial, deep or posterior cervical adenopathy. Upper Body:      Right upper body: No supraclavicular or axillary adenopathy. Left upper body: No supraclavicular or axillary adenopathy. BREAST ULTRASOUND  Indication: BILATERAL breast masslike thickening, LEFT breast nodularity  Technique:  The area was scanned using a high-frequency linear-array near-field transducer  Findings: Normal dense fibroglandular breast tissue bilaterally  Impression: Normal breast tissue  Disposition: No worrisome finding on ultrasound      ASSESSMENT and PLAN    ICD-10-CM ICD-9-CM    1. Fibrocystic breast changes, bilateral N60.11 610.1     N60.12        New patient presents for evaluation of BL breast lumps, and is doing well overall. Palpable masslike thickening on exam at RIGHT breast UOQ, and nodularity at LEFT breast 6:00. US visualizes normal dense fibroglandular breast tissue bilaterally. Discussed plan to continue observation, and pt agrees. F/U PRN. This plan was reviewed with the patient and patient agrees. All questions were answered.     Written by Bernard Godwin, as dictated by Dr. Rigo Martinez MD.

## 2021-05-28 ENCOUNTER — TRANSCRIBE ORDER (OUTPATIENT)
Dept: SCHEDULING | Age: 41
End: 2021-05-28

## 2021-05-28 DIAGNOSIS — Z12.31 VISIT FOR SCREENING MAMMOGRAM: Primary | ICD-10-CM

## 2021-06-15 ENCOUNTER — HOSPITAL ENCOUNTER (OUTPATIENT)
Dept: MAMMOGRAPHY | Age: 41
Discharge: HOME | End: 2021-06-15
Attending: OBSTETRICS & GYNECOLOGY
Payer: COMMERCIAL

## 2021-06-15 DIAGNOSIS — Z12.31 VISIT FOR SCREENING MAMMOGRAM: ICD-10-CM

## 2021-06-15 PROCEDURE — 77063 BREAST TOMOSYNTHESIS BI: CPT

## 2022-02-16 NOTE — PATIENT INSTRUCTIONS
Patient is aware   Weeks 26 to 30 of Your Pregnancy: Care Instructions  Your Care Instructions    You are now in your last trimester of pregnancy. Your baby is growing rapidly. And you'll probably feel your baby moving around more often. Your doctor may ask you to count your baby's kicks. Your back may ache as your body gets used to your baby's size and length. If you haven't already had the Tdap shot during this pregnancy, talk to your doctor about getting it. It will help protect your  against pertussis infection. During this time, it's important to take care of yourself and pay attention to what your body needs. If you feel sexual, explore ways to be close with your partner that match your comfort and desire. Use the tips provided in this care sheet to find ways to be sexual in your own way. Follow-up care is a key part of your treatment and safety. Be sure to make and go to all appointments, and call your doctor if you are having problems. It's also a good idea to know your test results and keep a list of the medicines you take. How can you care for yourself at home? Take it easy at work  · Take frequent breaks. If possible, stop working when you are tired, and rest during your lunch hour. · Take bathroom breaks every 2 hours. · Change positions often. If you sit for long periods, stand up and walk around. · When you stand for a long time, keep one foot on a low stool with your knee bent. After standing a lot, sit with your feet up. · Avoid fumes, chemicals, and tobacco smoke. Be sexual in your own way  · Having sex during pregnancy is okay, unless your doctor tells you not to. · You may be very interested in sex, or you may have no interest at all. · Your growing belly can make it hard to find a good position during intercourse. [place] and explore. · You may get cramps in your uterus when your partner touches your breasts.   · A back rub may relieve the backache or cramps that sometimes follow orgasm. Learn about  labor  · Watch for signs of  labor. You may be going into labor if:  ¨ You have menstrual-like cramps, with or without nausea. ¨ You have about 4 or more contractions in 20 minutes, or about 8 or more within 1 hour, even after you have had a glass of water and are resting. ¨ You have a low, dull backache that does not go away when you change your position. ¨ You have pain or pressure in your pelvis that comes and goes in a pattern. ¨ You have intestinal cramping or flu-like symptoms, with or without diarrhea. ¨ You notice an increase or change in your vaginal discharge. Discharge may be heavy, mucus-like, watery, or streaked with blood. ¨ Your water breaks. · If you think you have  labor:  ¨ Drink 2 or 3 glasses of water or juice. Not drinking enough fluids can cause contractions. ¨ Stop what you are doing, and empty your bladder. Then lie down on your left side for at least 1 hour. ¨ While lying on your side, find your breast bone. Put your fingers in the soft spot just below it. Move your fingers down toward your belly button to find the top of your uterus. Check to see if it is tight. ¨ Contractions can be weak or strong. Record your contractions for an hour. Time a contraction from the start of one contraction to the start of the next one. ¨ Single or several strong contractions without a pattern are called Tan-Comer contractions. They are practice contractions but not the start of labor. They often stop if you change what you are doing. ¨ Call your doctor if you have regular contractions. Where can you learn more? Go to http://abby-delbert.info/. Enter F155 in the search box to learn more about \"Weeks 26 to 30 of Your Pregnancy: Care Instructions. \"  Current as of: 2017  Content Version: 11.4  © 7756-7781 [employer].  Care instructions adapted under license by [employer] (which disclaims liability or warranty for this information). If you have questions about a medical condition or this instruction, always ask your healthcare professional. [place] [address] any warranty or liability for your use of this information.

## 2022-03-19 PROBLEM — O60.02 PRETERM LABOR IN SECOND TRIMESTER: Status: ACTIVE | Noted: 2018-07-07

## 2022-03-19 PROBLEM — Z34.90 PREGNANCY: Status: ACTIVE | Noted: 2018-07-05

## 2022-03-19 PROBLEM — O09.523 ELDERLY MULTIGRAVIDA IN THIRD TRIMESTER: Status: ACTIVE | Noted: 2018-02-26

## 2023-02-16 ENCOUNTER — TRANSCRIBE ORDER (OUTPATIENT)
Dept: SCHEDULING | Age: 43
End: 2023-02-16

## 2023-02-16 DIAGNOSIS — Z12.31 SCREENING MAMMOGRAM FOR HIGH-RISK PATIENT: Primary | ICD-10-CM

## 2023-03-28 ENCOUNTER — HOSPITAL ENCOUNTER (OUTPATIENT)
Dept: MAMMOGRAPHY | Age: 43
Discharge: HOME | End: 2023-03-28
Payer: COMMERCIAL

## 2023-03-28 DIAGNOSIS — Z12.31 SCREENING MAMMOGRAM FOR HIGH-RISK PATIENT: ICD-10-CM

## 2023-03-28 PROCEDURE — 77063 BREAST TOMOSYNTHESIS BI: CPT

## 2024-11-07 ENCOUNTER — HOSPITAL ENCOUNTER (OUTPATIENT)
Facility: HOSPITAL | Age: 44
Discharge: HOME | End: 2024-11-10
Payer: COMMERCIAL

## 2024-11-07 ENCOUNTER — TRANSCRIBE ORDERS (OUTPATIENT)
Facility: HOSPITAL | Age: 44
End: 2024-11-07

## 2024-11-07 VITALS — BODY MASS INDEX: 26.21 KG/M2 | HEIGHT: 59 IN | WEIGHT: 130 LBS

## 2024-11-07 DIAGNOSIS — Z12.31 OTHER SCREENING MAMMOGRAM: Primary | ICD-10-CM

## 2024-11-07 DIAGNOSIS — Z12.31 OTHER SCREENING MAMMOGRAM: ICD-10-CM

## 2024-11-07 PROCEDURE — 77063 BREAST TOMOSYNTHESIS BI: CPT
